# Patient Record
Sex: FEMALE | Race: WHITE | NOT HISPANIC OR LATINO | Employment: FULL TIME | URBAN - METROPOLITAN AREA
[De-identification: names, ages, dates, MRNs, and addresses within clinical notes are randomized per-mention and may not be internally consistent; named-entity substitution may affect disease eponyms.]

---

## 2017-01-19 ENCOUNTER — OFFICE VISIT (OUTPATIENT)
Dept: BEHAVIORAL HEALTH | Facility: PHYSICIAN GROUP | Age: 57
End: 2017-01-19
Payer: COMMERCIAL

## 2017-01-19 VITALS
SYSTOLIC BLOOD PRESSURE: 120 MMHG | TEMPERATURE: 98.1 F | RESPIRATION RATE: 16 BRPM | HEIGHT: 62 IN | DIASTOLIC BLOOD PRESSURE: 70 MMHG | HEART RATE: 92 BPM

## 2017-01-19 DIAGNOSIS — F33.0 MILD EPISODE OF RECURRENT MAJOR DEPRESSIVE DISORDER (HCC): ICD-10-CM

## 2017-01-19 PROCEDURE — 99213 OFFICE O/P EST LOW 20 MIN: CPT | Performed by: PSYCHIATRY & NEUROLOGY

## 2017-01-20 NOTE — PROGRESS NOTES
"[facility]  PSYCHIATRIC FOLLOW-UP NOTE    Name: Rita ALCALA [place]  MRN: [id number]  : 1960  Age: 56 y.o.  Date of assessment: 2017  PCP: DANNIELLE Ricardo  Persons in attendance: Patient    REASON FOR VISIT/CHIEF COMPLAINT (as stated by Patient):  Rita Kelly is a 56 y.o., White female, attending follow-up appointment for   Chief Complaint   Patient presents with   • Depression     The patient is seen today for follow up on her depression and she is doing well.   .      HISTORY OF PRESENT ILLNESS:    This is a 57 yo female, single , lives alone, seen today for follow up.    PSYCHOSOCIAL CHANGES SINCE PREVIOUS CONTACT:  Unchanged from previous visit.    RESPONSE TO TREATMENT:  Good.    MEDICATION SIDE EFFECTS:  Denied.    REVIEW OF SYSTEMS:        Constitutional positive - obese, gastric slevage   Eyes negative   Ears/Nose/Mouth/Throat negative   Cardiovascular positive - Mitral Valve prolapse   Respiratory positive - asthma.   Gastrointestinal negative   Genitourinary negative   Muscular negative   Integumentary negative   Neurological negative   Endocrine negative   Hematologic/Lymphatic negative       PSYCHIATRIC EXAMINATION/MENTAL STATUS  /70 mmHg  Pulse 92  Temp(Src) 36.7 °C (98.1 °F)  Resp 16  Ht 1.575 m (5' 2.01\")  Participation: Active verbal participation.  Grooming:Casual  Orientation: Alert and Fully Oriented  Eye contact: Good  Behavior:Calm   Mood: Anxious  Affect: Flexible and Full range  Thought process: Logical and Goal-directed  Thought content:  Within normal limits  Speech: Rate within normal limits and Volume within normal limits  Perception: with in normal.  Memory:  No gross evidence of memory deficits  Insight: Good  Judgment: Good  Family/couple interaction observations:   Other:    Current risk:    Suicide: Low   Homicide: Low   Self-harm: Low  Relapse: Low  Other:   Crisis Safety Plan reviewed?Yes  If evidence of imminent risk is present, " intervention/plan:    Medical Records/Labs/Diagnostic Tests Reviewed: yes.    Medical Records/Labs/Diagnostic Tests Ordered: none.    DIAGNOSTIC IMPRESSION(S):  1. Mild episode of recurrent major depressive disorder (CMS-[facility])           ASSESSMENT AND PLAN:  Major depression   stable on sertraline.    More than 50% of face-to-face time in this 30 minute visit was spent in psychotherapy/counseling.    Topics addressed include:    Willard Monroy M.D.

## 2017-03-16 ENCOUNTER — OFFICE VISIT (OUTPATIENT)
Dept: CARDIOLOGY | Facility: MEDICAL CENTER | Age: 57
End: 2017-03-16
Payer: COMMERCIAL

## 2017-03-16 VITALS
HEART RATE: 100 BPM | DIASTOLIC BLOOD PRESSURE: 90 MMHG | SYSTOLIC BLOOD PRESSURE: 140 MMHG | HEIGHT: 62 IN | WEIGHT: 277 LBS | OXYGEN SATURATION: 93 % | BODY MASS INDEX: 50.97 KG/M2

## 2017-03-16 DIAGNOSIS — R00.0 TACHYCARDIA: ICD-10-CM

## 2017-03-16 DIAGNOSIS — R06.09 DYSPNEA ON EXERTION: ICD-10-CM

## 2017-03-16 DIAGNOSIS — G47.33 OBSTRUCTIVE SLEEP APNEA: ICD-10-CM

## 2017-03-16 LAB — EKG IMPRESSION: NORMAL

## 2017-03-16 PROCEDURE — 93000 ELECTROCARDIOGRAM COMPLETE: CPT | Performed by: INTERNAL MEDICINE

## 2017-03-16 PROCEDURE — 99204 OFFICE O/P NEW MOD 45 MIN: CPT | Performed by: INTERNAL MEDICINE

## 2017-03-16 RX ORDER — PANTOPRAZOLE SODIUM 40 MG/1
40 TABLET, DELAYED RELEASE ORAL DAILY
COMMUNITY
Start: 2017-03-10

## 2017-03-16 ASSESSMENT — ENCOUNTER SYMPTOMS
EYE DISCHARGE: 0
HEARTBURN: 1
BRUISES/BLEEDS EASILY: 0
HEADACHES: 1
COUGH: 1
NERVOUS/ANXIOUS: 0
BACK PAIN: 1
NAUSEA: 0
PALPITATIONS: 0
WHEEZING: 1
CHILLS: 0
HEMOPTYSIS: 0
EYE PAIN: 0
MYALGIAS: 0
ABDOMINAL PAIN: 0
BLURRED VISION: 0
FEVER: 0
SPUTUM PRODUCTION: 1
DEPRESSION: 1
VOMITING: 0
LOSS OF CONSCIOUSNESS: 0
SPEECH CHANGE: 0

## 2017-03-16 NOTE — PROGRESS NOTES
Subjective:   Rita Kelly is a 56 y.o. female who presents today for new patient evaluation because of tachycardia.    A couple of occasions she's been noted to have some mild sinus tachycardia by her PCP. She was therefore referred for evaluation.    She has dyspnea on exertion at 100-200 feet. She's noted no PND or orthopnea. She does have a history of obstructive sleep apnea but is not using CPAP therapy. She's noted no edema or palpitations. A couple of times here she does have some lightheadedness which lasted minute or 2. This usually resolves when she sits down. She occasionally notes some sharp chest discomfort lasting seconds.    Past Medical History   Diagnosis Date   • Anxiety 6/9/2009   • Allergic rhinitis 6/9/2009   • Perimenopausal 6/9/2009   • Heart burn    • Coughing blood    • Breath shortness    • Snoring    • CATARACT    • Other specified symptom associated with female genital organs       SEVERE PMS   • Indigestion    • Psychiatric problem      DEPRESSION   • Anesthesia 2011     felt pain even though unconscious   • ASTHMA    • Hiatus hernia syndrome    • Sleep apnea      uses cpap     Past Surgical History   Procedure Laterality Date   • Cataract extraction with iol  2008     bilat   • Uvulopharyngopalatoplasty  3/17/2010     Performed by JALEN CHRISTY at SURGERY SAME DAY [facility] ORS   • Tonsillectomy  3/17/2010     Performed by JALEN CHRISTY at SURGERY SAME DAY [facility] ORS   • Antrostomy  3/17/2010     Performed by JALEN CHRISTY at SURGERY SAME DAY [facility] ORS   • Ethmoidectomy  3/17/2010     Performed by JALEN CHRISTY at SURGERY SAME DAY [facility] ORS   • Turbinoplasty  3/17/2010     Performed by JALEN CHRISTY at SURGERY SAME DAY [facility] ORS   • Gastroscopy  9/9/2010     Performed by MARQUIS QURESHI at SURGERY [facility] ORS   • Gastric sleeve laparoscopy  2010   • Splenectomy  2011   • Marixa by laparoscopy     • Hiatal hernia repair     • Tracheostomy      • Gastroscopy  2011     several with stent placement/removal   • Ventral hernia repair laparoscopic  4/12/2013     Performed by Farhad Ignacio M.D. at [facility]     Family History   Problem Relation Age of Onset   • Diabetes     • Heart Disease     • Hypertension     • Stroke     • Cancer     • Lung Disease     • Heart Disease Father 62     CABG and subsequent valve replacement surgery     History   Smoking status   • Former Smoker -- 0.50 packs/day for 20 years   • Types: Cigarettes   • Quit date: 01/01/1995   Smokeless tobacco   • Never Used     Comment: 1 ppd, 15 yrs     Allergies   Allergen Reactions   • Other Misc      wool   • Vancomycin Rash     Outpatient Encounter Prescriptions as of 3/16/2017   Medication Sig Dispense Refill   • pantoprazole (PROTONIX) 40 MG Tablet Delayed Response Take 40 mg by mouth every day.     • sertraline (ZOLOFT) 50 MG Tab TAKE ONE TABLET BY MOUTH AT BEDTIME 30 Tab 5   • Cholecalciferol (VITAMIN D3) 2000 UNITS CHEW Take  by mouth.     • Multiple Vitamin (MULTI-VITAMIN PO) Take  by mouth.     • CALCIUM PO Take  by mouth.     • NON SPECIFIED Hydro eye      • VITAMIN E by Does not apply route.     • Albuterol (VENTOLIN INH) Inhale  by mouth.       No facility-administered encounter medications on file as of 3/16/2017.     Review of Systems   Constitutional: Positive for malaise/fatigue. Negative for fever and chills.   HENT: Positive for nosebleeds. Negative for congestion.    Eyes: Negative for blurred vision, pain and discharge.   Respiratory: Positive for cough, sputum production and wheezing. Negative for hemoptysis.    Cardiovascular: Positive for chest pain. Negative for palpitations.   Gastrointestinal: Positive for heartburn. Negative for nausea, vomiting and abdominal pain.   Musculoskeletal: Positive for back pain and joint pain. Negative for myalgias.   Skin: Negative for itching and rash.   Neurological: Positive for headaches. Negative for speech change  "and loss of consciousness.   Endo/Heme/Allergies: Positive for environmental allergies. Does not bruise/bleed easily.   Psychiatric/Behavioral: Positive for depression. The patient is not nervous/anxious.    All other systems reviewed and are negative.       Objective:   /90 mmHg  Pulse 100  Ht 1.575 m (5' 2.01\")  Wt 125.646 kg (277 lb)  BMI 50.65 kg/m2  SpO2 93%    Physical Exam   Constitutional: She is oriented to person, place, and time. She appears well-developed. No distress.   HENT:   Mouth/Throat: Mucous membranes are normal.   Eyes: Conjunctivae and EOM are normal.   Neck: No JVD present. No tracheal deviation present. No thyroid mass and no thyromegaly present.   Cardiovascular: Normal rate, regular rhythm and intact distal pulses.    Murmur (treatment 3/6 systolic) heard.  Pulmonary/Chest: Effort normal and breath sounds normal. No respiratory distress. She exhibits no tenderness.   Abdominal: Soft. There is no tenderness.   Musculoskeletal: Normal range of motion. She exhibits no edema.   Neurological: She is alert and oriented to person, place, and time. She has normal strength. She displays no tremor.   Skin: Skin is warm and dry. She is not diaphoretic.   Psychiatric: She has a normal mood and affect. Her behavior is normal.   Vitals reviewed.    EKG: This shows a normal sinus rhythm with poor anterior R-wave progression and is otherwise unremarkable.    Postural vital signs: Patient had no CVA change in her pulse or heart rate from supine to sitting to standing.    Laboratory pulse ox patient's pulse oximetry did drop to 88% with ambulation. Her heart rate went up to 114.    Assessment:     1. Tachycardia     2. Obstructive sleep apnea     3. Dyspnea on exertion           Medical Decision Making:  Today's Assessment / Status / Plan:     Ms. Kelly is having difficulty with what is probably sinus tachycardia. I suspect this is due to poor physical conditioning. However, she does have some " mild hypoxemia with exertion. Given her history of obstructive sleep apnea, we will for her to pulmonary. She may benefit from CPAP. In addition, we discussed increasing her activity and getting an activity tracker. In addition, I would like her to walk for 30 minutes continuously at least 3 times a week and preferably more often. She will follow-up in a few months with an echocardiogram prior.

## 2017-03-16 NOTE — Clinical Note
[facility]   [address]  [place] [address]  Phone: [phone]  Fax: [phone]              Rita ALCALA [place]  1960    Encounter Date: 3/16/2017    Cesar Johnson M.D.          PROGRESS NOTE:  Subjective:   Rita Kelly is a 56 y.o. female who presents today for new patient evaluation because of tachycardia.    A couple of occasions she's been noted to have some mild sinus tachycardia by her PCP. She was therefore referred for evaluation.    She has dyspnea on exertion at 100-200 feet. She's noted no PND or orthopnea. She does have a history of obstructive sleep apnea but is not using CPAP therapy. She's noted no edema or palpitations. A couple of times here she does have some lightheadedness which lasted minute or 2. This usually resolves when she sits down. She occasionally notes some sharp chest discomfort lasting seconds.    Past Medical History   Diagnosis Date   • Anxiety 6/9/2009   • Allergic rhinitis 6/9/2009   • Perimenopausal 6/9/2009   • Heart burn    • Coughing blood    • Breath shortness    • Snoring    • CATARACT    • Other specified symptom associated with female genital organs       SEVERE PMS   • Indigestion    • Psychiatric problem      DEPRESSION   • Anesthesia 2011     felt pain even though unconscious   • ASTHMA    • Hiatus hernia syndrome    • Sleep apnea      uses cpap     Past Surgical History   Procedure Laterality Date   • Cataract extraction with iol  2008     bilat   • Uvulopharyngopalatoplasty  3/17/2010     Performed by JALEN CHRISTY at SURGERY SAME DAY [facility] ORS   • Tonsillectomy  3/17/2010     Performed by JALEN CHRISTY at SURGERY SAME DAY [facility] ORS   • Antrostomy  3/17/2010     Performed by JALEN CHRISTY at SURGERY SAME DAY [facility] ORS   • Ethmoidectomy  3/17/2010     Performed by JALEN CHRISTY at SURGERY SAME DAY [facility] ORS   • Turbinoplasty  3/17/2010     Performed by  JALEN CHRISTY at SURGERY SAME DAY [facility] ORS   • Gastroscopy  9/9/2010     Performed by MARQUIS QURESHI at SURGERY [facility] ORS   • Gastric sleeve laparoscopy  2010   • Splenectomy  2011   • Marixa by laparoscopy     • Hiatal hernia repair     • Tracheostomy     • Gastroscopy  2011     several with stent placement/removal   • Ventral hernia repair laparoscopic  4/12/2013     Performed by Farhad Ignacio M.D. at SURGERY [facility] ORS     Family History   Problem Relation Age of Onset   • Diabetes     • Heart Disease     • Hypertension     • Stroke     • Cancer     • Lung Disease     • Heart Disease Father 62     CABG and subsequent valve replacement surgery     History   Smoking status   • Former Smoker -- 0.50 packs/day for 20 years   • Types: Cigarettes   • Quit date: 01/01/1995   Smokeless tobacco   • Never Used     Comment: 1 ppd, 15 yrs     Allergies   Allergen Reactions   • Other Misc      wool   • Vancomycin Rash     Outpatient Encounter Prescriptions as of 3/16/2017   Medication Sig Dispense Refill   • pantoprazole (PROTONIX) 40 MG Tablet Delayed Response Take 40 mg by mouth every day.     • sertraline (ZOLOFT) 50 MG Tab TAKE ONE TABLET BY MOUTH AT BEDTIME 30 Tab 5   • Cholecalciferol (VITAMIN D3) 2000 UNITS CHEW Take  by mouth.     • Multiple Vitamin (MULTI-VITAMIN PO) Take  by mouth.     • CALCIUM PO Take  by mouth.     • NON SPECIFIED Hydro eye      • VITAMIN E by Does not apply route.     • Albuterol (VENTOLIN INH) Inhale  by mouth.       No facility-administered encounter medications on file as of 3/16/2017.     Review of Systems   Constitutional: Positive for malaise/fatigue. Negative for fever and chills.   HENT: Positive for nosebleeds. Negative for congestion.    Eyes: Negative for blurred vision, pain and discharge.   Respiratory: Positive for cough, sputum production and wheezing. Negative for hemoptysis.    Cardiovascular: Positive for chest pain. Negative for palpitations.    "  Gastrointestinal: Positive for heartburn. Negative for nausea, vomiting and abdominal pain.   Musculoskeletal: Positive for back pain and joint pain. Negative for myalgias.   Skin: Negative for itching and rash.   Neurological: Positive for headaches. Negative for speech change and loss of consciousness.   Endo/Heme/Allergies: Positive for environmental allergies. Does not bruise/bleed easily.   Psychiatric/Behavioral: Positive for depression. The patient is not nervous/anxious.    All other systems reviewed and are negative.       Objective:   /90 mmHg  Pulse 100  Ht 1.575 m (5' 2.01\")  Wt 125.646 kg (277 lb)  BMI 50.65 kg/m2  SpO2 93%    Physical Exam   Constitutional: She is oriented to person, place, and time. She appears well-developed. No distress.   HENT:   Mouth/Throat: Mucous membranes are normal.   Eyes: Conjunctivae and EOM are normal.   Neck: No JVD present. No tracheal deviation present. No thyroid mass and no thyromegaly present.   Cardiovascular: Normal rate, regular rhythm and intact distal pulses.    Murmur (treatment 3/6 systolic) heard.  Pulmonary/Chest: Effort normal and breath sounds normal. No respiratory distress. She exhibits no tenderness.   Abdominal: Soft. There is no tenderness.   Musculoskeletal: Normal range of motion. She exhibits no edema.   Neurological: She is alert and oriented to person, place, and time. She has normal strength. She displays no tremor.   Skin: Skin is warm and dry. She is not diaphoretic.   Psychiatric: She has a normal mood and affect. Her behavior is normal.   Vitals reviewed.    EKG: This shows a normal sinus rhythm with poor anterior R-wave progression and is otherwise unremarkable.    Postural vital signs: Patient had no CVA change in her pulse or heart rate from supine to sitting to standing.    Laboratory pulse ox patient's pulse oximetry did drop to 88% with ambulation. Her heart rate went up to 114.    Assessment:     1. Tachycardia     2. " Obstructive sleep apnea     3. Dyspnea on exertion           Medical Decision Making:  Today's Assessment / Status / Plan:     Ms. Kelly is having difficulty with what is probably sinus tachycardia. I suspect this is due to poor physical conditioning. However, she does have some mild hypoxemia with exertion. Given her history of obstructive sleep apnea, we will for her to pulmonary. She may benefit from CPAP. In addition, we discussed increasing her activity and getting an activity tracker. In addition, I would like her to walk for 30 minutes continuously at least 3 times a week and preferably more often. She will follow-up in a few months with an echocardiogram prior.      Myranda Alvarado, F.N.P.  [address]  [facility] [address]  VIA Facsimile: [phone]

## 2017-03-16 NOTE — MR AVS SNAPSHOT
"        Rita A [place]   3/16/2017 1:00 PM   Office Visit   MRN: [id number]    Department:  [facility]   Dept Phone:  [phone]    Description:  Female : 1960   Provider:  Cesar Johnson M.D.           Reason for Visit     New Patient           Allergies as of 3/16/2017     Allergen Noted Reactions    Other Misc 2013       wool    Vancomycin 2013   Rash      You were diagnosed with     Tachycardia   [027007]       Obstructive sleep apnea   [169892]       Dyspnea on exertion   [297146]         Vital Signs     Blood Pressure Pulse Height Weight Body Mass Index Oxygen Saturation    140/90 mmHg 100 1.575 m (5' 2.01\") 125.646 kg (277 lb) 50.65 kg/m2 93%    Smoking Status                   Former Smoker           Basic Information     Date Of Birth Sex Race Ethnicity Preferred Language    1960 Female White Non- English      Your appointments     2017  1:15 PM   ECHO with ECHO [facility], V EXAM 9   ECHOCARDIOLOGY [facility] ([facility])    [address] [place] [address]   [phone]           No prep            2017  4:30 PM   Follow Up Med Management with Willard Monroy M.D.   BE[facility]IORAL [facility] [address] [facility])    [address]  [address]  [place] [address]   [phone]            2017  3:00 PM   FOLLOW UP with Cesar Johnson M.D.   [facility] (--)    [address], [address]  [place] [id number] [phone]              Problem List              ICD-10-CM Priority Class Noted - Resolved    Anxiety F41.9   2009 - Present    Allergic rhinitis    2009 - Present    Perimenopausal N95.1   2009 - Present    Incisional hernia K43.2   2013 - Present    Hernia K46.9   2013 - Present    Leukocytosis D72.829   2013 - Present    Tachycardia R00.0   3/16/2017 - Present    Obstructive sleep apnea G47.33   3/16/2017 - Present    Dyspnea on exertion R06.09   3/16/2017 - Present      Health " Maintenance        Date Due Completion Dates    IMM DTaP/Tdap/Td Vaccine (1 - Tdap) 12/3/1981 ---    PAP SMEAR 12/3/1983 ---    COLONOSCOPY 12/3/2012 ---    IMM INFLUENZA (1) 9/1/2016 ---    MAMMOGRAM 5/5/2017 5/5/2016, 2/24/2014, 2/14/2013, 2/12/2009, 2/12/2009, 5/10/2007, 5/10/2007, 3/20/2006            Results       Current Immunizations     No immunizations on file.      Below and/or attached are the medications your provider expects you to take. Review all of your home medications and newly ordered medications with your provider and/or pharmacist. Follow medication instructions as directed by your provider and/or pharmacist. Please keep your medication list with you and share with your provider. Update the information when medications are discontinued, doses are changed, or new medications (including over-the-counter products) are added; and carry medication information at all times in the event of emergency situations     Allergies:  OTHER MISC - (reactions not documented)     VANCOMYCIN - Rash               Medications  Valid as of: March 16, 2017 -  2:57 PM    Generic Name Brand Name Tablet Size Instructions for use    Albuterol   Inhale  by mouth.        Calcium   Take  by mouth.        Cholecalciferol (Chew Tab) Vitamin D3 2000 UNITS Take  by mouth.        Multiple Vitamin   Take  by mouth.        NON SPECIFIED   Hydro eye         Pantoprazole Sodium (Tablet Delayed Response) PROTONIX 40 MG Take 40 mg by mouth every day.        Sertraline HCl (Tab) ZOLOFT 50 MG TAKE ONE TABLET BY MOUTH AT BEDTIME        Vitamin E   by Does not apply route.        .                 Medicines prescribed today were sent to:     [facility] [address] [place] [address] [address] [address]    Phone: [phone] Fax: [phone]    Open 24 Hours?: No      Medication refill instructions:       If your prescription bottle indicates you have medication refills left, it is not necessary to  call your provider’s office. Please contact your pharmacy and they will refill your medication.    If your prescription bottle indicates you do not have any refills left, you may request refills at any time through one of the following ways: The online [employer] system (except Urgent Care), by calling your provider’s office, or by asking your pharmacy to contact your provider’s office with a refill request. Medication refills are processed only during regular business hours and may not be available until the next business day. Your provider may request additional information or to have a follow-up visit with you prior to refilling your medication.   *Please Note: Medication refills are assigned a new Rx number when refilled electronically. Your pharmacy may indicate that no refills were authorized even though a new prescription for the same medication is available at the pharmacy. Please request the medicine by name with the pharmacy before contacting your provider for a refill.        Your To Do List     Future Labs/Procedures Complete By Expires    Echocardiogram Comp w/o Cont  As directed 3/16/2018    Scheduling Instructions:    Within the next few months      Referral     A referral request has been sent to our patient care coordination department. Please allow 3-5 business days for us to process this request and contact you either by phone or mail. If you do not hear from us by the 5th business day, please call us at [phone].           [employer] Access Code: [email or web address]  Expires: 4/15/2017  2:36 PM    [employer]  A secure, online tool to manage your health information     [employer]’s [employer]® is a secure, online tool that connects you to your personalized health information from the privacy of your home -- day or night - making it very easy for you to manage your healthcare. Once the activation process is completed, you can even access your medical information using the [employer] maría, which is available  for free in the [employer] store or Google Play store.     [employer] provides the following levels of access (as shown below):   My Chart Features   [facility] Doctor [facility]  Specialists [facility]  Urgent  Care Non-[facility]  Primary Care  Doctor   Email your healthcare team securely and privately 24/7 X X X    Manage appointments: schedule your next appointment; view details of past/upcoming appointments X      Request prescription refills. X      View recent personal medical records, including lab and immunizations X X X X   View health record, including health history, allergies, medications X X X X   Read reports about your outpatient visits, procedures, consult and ER notes X X X X   See your discharge summary, which is a recap of your hospital and/or ER visit that includes your diagnosis, lab results, and care plan. X X       How to register for [employer]:  1. Go to  [email or web address].  2. Click on the Sign Up Now box, which takes you to the New Member Sign Up page. You will need to provide the following information:  a. Enter your [employer] Access Code exactly as it appears at the top of this page. (You will not need to use this code after you’ve completed the sign-up process. If you do not sign up before the expiration date, you must request a new code.)   b. Enter your date of birth.   c. Enter your home email address.   d. Click Submit, and follow the next screen’s instructions.  3. Create a [employer] ID. This will be your [employer] login ID and cannot be changed, so think of one that is secure and easy to remember.  4. Create a [employer] password. You can change your password at any time.  5. Enter your Password Reset Question and Answer. This can be used at a later time if you forget your password.   6. Enter your e-mail address. This allows you to receive e-mail notifications when new information is available in [employer].  7. Click Sign Up. You can now view your health information.    For assistance  activating your [employer] account, call [phone]

## 2017-04-13 ENCOUNTER — HOSPITAL ENCOUNTER (OUTPATIENT)
Dept: CARDIOLOGY | Facility: MEDICAL CENTER | Age: 57
End: 2017-04-13
Attending: INTERNAL MEDICINE
Payer: COMMERCIAL

## 2017-04-13 DIAGNOSIS — G47.33 OBSTRUCTIVE SLEEP APNEA: ICD-10-CM

## 2017-04-13 DIAGNOSIS — R00.0 TACHYCARDIA: ICD-10-CM

## 2017-04-13 DIAGNOSIS — R06.09 DYSPNEA ON EXERTION: ICD-10-CM

## 2017-04-13 PROCEDURE — 93306 TTE W/DOPPLER COMPLETE: CPT | Mod: 26 | Performed by: INTERNAL MEDICINE

## 2017-04-13 PROCEDURE — 93306 TTE W/DOPPLER COMPLETE: CPT

## 2017-04-14 LAB
LV EJECT FRACT  99904: 65
LV EJECT FRACT MOD 2C 99903: 79.09
LV EJECT FRACT MOD 4C 99902: 69.27
LV EJECT FRACT MOD BP 99901: 72

## 2017-04-20 ENCOUNTER — OFFICE VISIT (OUTPATIENT)
Dept: BEHAVIORAL HEALTH | Facility: PHYSICIAN GROUP | Age: 57
End: 2017-04-20
Payer: COMMERCIAL

## 2017-04-20 VITALS
HEART RATE: 90 BPM | SYSTOLIC BLOOD PRESSURE: 132 MMHG | BODY MASS INDEX: 50.97 KG/M2 | RESPIRATION RATE: 16 BRPM | WEIGHT: 277 LBS | DIASTOLIC BLOOD PRESSURE: 78 MMHG | TEMPERATURE: 98.1 F | HEIGHT: 62 IN

## 2017-04-20 DIAGNOSIS — F33.0 MILD EPISODE OF RECURRENT MAJOR DEPRESSIVE DISORDER (HCC): ICD-10-CM

## 2017-04-20 PROCEDURE — 99213 OFFICE O/P EST LOW 20 MIN: CPT | Performed by: PSYCHIATRY & NEUROLOGY

## 2017-04-21 NOTE — PROGRESS NOTES
"[facility]  PSYCHIATRIC FOLLOW-UP NOTE    Name: Rita [place]A [place]  MRN: [id number]  : 1960  Age: 56 y.o.  Date of assessment: 2017  PCP: DANNIELLE Ricardo  Persons in attendance: Patient    REASON FOR VISIT/CHIEF COMPLAINT (as stated by Patient):  Rita ALCALA [place] is a 56 y.o., White female, attending follow-up appointment for   Chief Complaint   Patient presents with   • Depression     The patient is seen today for follow up on her depression.   .      HISTORY OF PRESENT ILLNESS:    This is a 55 yo female, employed, single, seen today for follow up on her depression. The patient reported increase stress at work and they are moving to a different location. The patient has been sleeping better.    PSYCHOSOCIAL CHANGES SINCE PREVIOUS CONTACT:  Denied depression but complained of anxiety and worries.    RESPONSE TO TREATMENT:  Good.    MEDICATION SIDE EFFECTS:  Denied.    REVIEW OF SYSTEMS:        Constitutional positive - obesity   Eyes positive - cataract   Ears/Nose/Mouth/Throat negative   Cardiovascular negative   Respiratory positive - obstructive sleep apnea, asthma   Gastrointestinal negative   Genitourinary negative   Muscular negative   Integumentary negative   Neurological negative   Endocrine negative   Hematologic/Lymphatic negative       PSYCHIATRIC EXAMINATION/MENTAL STATUS  /78 mmHg  Pulse 90  Temp(Src) 36.7 °C (98.1 °F)  Resp 16  Ht 1.575 m (5' 2.01\")  Wt 125.646 kg (277 lb)  BMI 50.65 kg/m2  Participation: Active verbal participation and Attentive  Grooming:Casual  Orientation: Alert and Fully Oriented  Eye contact: Good  Behavior:Calm   Mood: Anxious  Affect: Flexible and Full range  Thought process: Logical and Goal-directed  Thought content:  Within normal limits  Speech: Rate within normal limits and Volume within normal limits  Perception:  Within normal limits  Memory:  No gross evidence of memory deficits  Insight: Good  Judgment: Good  Family/couple " interaction observations:   Other:    Current risk:    Suicide: Low   Homicide: Low   Self-harm: Low  Relapse: Low  Other:   Crisis Safety Plan reviewed?Yes  If evidence of imminent risk is present, intervention/plan:    Medical Records/Labs/Diagnostic Tests Reviewed: yes.    Medical Records/Labs/Diagnostic Tests Ordered: none.    DIAGNOSTIC IMPRESSION(S):  1. Mild episode of recurrent major depressive disorder (CMS-HCC)           ASSESSMENT AND PLAN:  Major depression  Sertraline 50 mg per day  Follow up in three months.    More than 50% of face-to-face time in this 30 minute visit was spent in psychotherapy/counseling.    Topics addressed include:  Diet and exercise.  Cognitive restructuring na d changes in behavior.  Good impulse control  Relaxation and breathing exercise.    Willard Monroy M.D.

## 2017-06-27 ENCOUNTER — OFFICE VISIT (OUTPATIENT)
Dept: CARDIOLOGY | Facility: MEDICAL CENTER | Age: 57
End: 2017-06-27
Payer: COMMERCIAL

## 2017-06-27 ENCOUNTER — TELEPHONE (OUTPATIENT)
Dept: CARDIOLOGY | Facility: MEDICAL CENTER | Age: 57
End: 2017-06-27

## 2017-06-27 VITALS
BODY MASS INDEX: 50.97 KG/M2 | HEIGHT: 62 IN | SYSTOLIC BLOOD PRESSURE: 140 MMHG | DIASTOLIC BLOOD PRESSURE: 80 MMHG | OXYGEN SATURATION: 90 % | HEART RATE: 86 BPM | WEIGHT: 277 LBS

## 2017-06-27 DIAGNOSIS — G47.33 OBSTRUCTIVE SLEEP APNEA: ICD-10-CM

## 2017-06-27 DIAGNOSIS — R06.09 DYSPNEA ON EXERTION: ICD-10-CM

## 2017-06-27 DIAGNOSIS — I35.0 AORTIC STENOSIS, MILD: ICD-10-CM

## 2017-06-27 DIAGNOSIS — R00.0 TACHYCARDIA: ICD-10-CM

## 2017-06-27 PROCEDURE — 99214 OFFICE O/P EST MOD 30 MIN: CPT | Performed by: INTERNAL MEDICINE

## 2017-06-27 NOTE — Clinical Note
[facility]   [address]  [place] [address]  Phone: [phone]  Fax: [phone]              Rita ALCALA [place]  1960    Encounter Date: 6/27/2017    Cesar Johnson M.D.          PROGRESS NOTE:  Subjective:   Rita [place] is a 56 y.o. female who presents today for follow-up of her tachycardia. She also has dyspnea on exertion and a history of obstructive sleep apnea.    She was having difficulty with sinus tachycardia. We discussed increasing her activity level and getting an activity tractor. She is getting them between 8000 and 10,000 steps daily. She is going for a 30 minute walk about twice weekly. She does have an appointment to see pulmonary in early July.    She feels she can walk a couple 100 yards before she is dyspneic. This is some improvement from her prior activity level. She is using her CPAP at night. She is still somewhat somnolent intermittently.    She's noted no chest discomfort, edema, palpitations or lightheadedness.    Past Medical History   Diagnosis Date   • Anxiety 6/9/2009   • Allergic rhinitis 6/9/2009   • Perimenopausal 6/9/2009   • Heart burn    • Coughing blood    • Breath shortness    • Snoring    • CATARACT    • Other specified symptom associated with female genital organs       SEVERE PMS   • Indigestion    • Psychiatric problem      DEPRESSION   • Anesthesia 2011     felt pain even though unconscious   • ASTHMA    • Hiatus hernia syndrome    • Sleep apnea      uses cpap   • Asthma      Past Surgical History   Procedure Laterality Date   • Cataract extraction with iol  2008     bilat   • Uvulopharyngopalatoplasty  3/17/2010     Performed by JALEN CHRISTY at SURGERY SAME DAY [facility] ORS   • Tonsillectomy  3/17/2010     Performed by JALEN CHRISTY at SURGERY SAME DAY [facility] ORS   • Antrostomy  3/17/2010     Performed by JALEN CHRISTY at SURGERY SAME DAY [facility] ORS   • Ethmoidectomy   "3/17/2010     Performed by JALEN CHRISTY at SURGERY SAME DAY [facility] ORS   • Turbinoplasty  3/17/2010     Performed by JALEN CHRISTY at SURGERY SAME DAY [facility] ORS   • Gastroscopy  9/9/2010     Performed by MARQUIS QURESHI at SURGERY [facility] ORS   • Gastric sleeve laparoscopy  2010   • Splenectomy  2011   • Marixa by laparoscopy     • Hiatal hernia repair     • Tracheostomy     • Gastroscopy  2011     several with stent placement/removal   • Ventral hernia repair laparoscopic  4/12/2013     Performed by Farhad Ignacio M.D. at SURGERY [facility]     Family History   Problem Relation Age of Onset   • Diabetes     • Heart Disease     • Hypertension     • Stroke     • Cancer     • Lung Disease     • Heart Disease Father 62     CABG and subsequent valve replacement surgery     History   Smoking status   • Former Smoker -- 0.50 packs/day for 20 years   • Types: Cigarettes   • Quit date: 01/01/1995   Smokeless tobacco   • Never Used     Comment: 1 ppd, 15 yrs     Allergies   Allergen Reactions   • Other Misc      wool   • Vancomycin Rash     Outpatient Encounter Prescriptions as of 6/27/2017   Medication Sig Dispense Refill   • Fluticasone Furoate-Vilanterol (BREO ELLIPTA INH) Inhale  by mouth.     • BIOTIN PO Take  by mouth.     • pantoprazole (PROTONIX) 40 MG Tablet Delayed Response Take 40 mg by mouth every day.     • sertraline (ZOLOFT) 50 MG Tab TAKE ONE TABLET BY MOUTH AT BEDTIME 30 Tab 5   • Cholecalciferol (VITAMIN D3) 2000 UNITS CHEW Take  by mouth.     • Multiple Vitamin (MULTI-VITAMIN PO) Take  by mouth.     • CALCIUM PO Take  by mouth.     • NON SPECIFIED Hydro eye      • VITAMIN E by Does not apply route.     • Albuterol (VENTOLIN INH) Inhale  by mouth.       No facility-administered encounter medications on file as of 6/27/2017.     ROS     Objective:   /80 mmHg  Pulse 86  Ht 1.575 m (5' 2\")  Wt 125.646 kg (277 lb)  BMI 50.65 kg/m2  SpO2 90%    Physical Exam   Neck: No JVD " present.   Cardiovascular: Normal rate and regular rhythm.  Exam reveals no gallop.    Murmur (2 to 3/6 systolic at the base) heard.  Pulmonary/Chest: Effort normal. She has no rales.   Abdominal: Soft. There is no tenderness.   Musculoskeletal: She exhibits no edema.     Echocardiogram:  CONCLUSIONS  No prior study is available for comparison.   Normal left ventricular chamber size. Normal left ventricular wall   thickness. Normal left ventricular systolic function. Left ventricular   ejection fraction is visually estimated to be 65%. A reliable   estimation of diastolic function cannot be made due to conflicting   data.   Mild aortic stenosis. Transvalvular gradients are - Peak:24  mmHg,   Mean: 12 mmHg.  Vmax is 2.44  m/s. Dimensionless index is. 47.  Mild   aortic insufficiency. Valve is not optimally visualized but is probably   tri leaflet. Increased gradient could be do to supra or sub valvular   membrane. Cannot exclude some increased gradient from a hyperdynamic   state.  Exam Date:         04/13/2017       Assessment:     1. Tachycardia     2. Obstructive sleep apnea     3. Dyspnea on exertion     4. Aortic stenosis, mild        Medical Decision Making:  Today's Assessment / Status / Plan:     Ms. Kelly is clinically stable. She's had significant improvement in her tachycardia and is walking more regularly. I feel she could lose some weight this would improve both her heart rate and her obstructive sleep apnea. She is going to see pulmonary about the adequacy of her CPAP. Her dyspnea on exertion has also improved somewhat. I would like to try Mediterranean-style diet. Her echocardiogram is consistent with mild aortic stenosis of the valve was not optimally visualized. Her LV function was normal. She'll continue her activity level and follow-up in about 6 months.      Myranda Alvarado, F.N.P.  [address]  [place] [address]  VIA Facsimile: [phone]

## 2017-06-27 NOTE — TELEPHONE ENCOUNTER
Spoke with Dr. Johnson, he read the patient's echocardiogram which shows mild aortic stenosis. No changes in therapy needed at this time.    Called patient and advised her of the above. Patient verbalized understanding and is thankful for the call.    JASMIN ROGEL

## 2017-06-27 NOTE — PROGRESS NOTES
Subjective:   Rita Kelly is a 56 y.o. female who presents today for follow-up of her tachycardia. She also has dyspnea on exertion and a history of obstructive sleep apnea.    She was having difficulty with sinus tachycardia. We discussed increasing her activity level and getting an activity tractor. She is getting them between 8000 and 10,000 steps daily. She is going for a 30 minute walk about twice weekly. She does have an appointment to see pulmonary in early July.    She feels she can walk a couple 100 yards before she is dyspneic. This is some improvement from her prior activity level. She is using her CPAP at night. She is still somewhat somnolent intermittently.    She's noted no chest discomfort, edema, palpitations or lightheadedness.    Past Medical History   Diagnosis Date   • Anxiety 6/9/2009   • Allergic rhinitis 6/9/2009   • Perimenopausal 6/9/2009   • Heart burn    • Coughing blood    • Breath shortness    • Snoring    • CATARACT    • Other specified symptom associated with female genital organs       SEVERE PMS   • Indigestion    • Psychiatric problem      DEPRESSION   • Anesthesia 2011     felt pain even though unconscious   • ASTHMA    • Hiatus hernia syndrome    • Sleep apnea      uses cpap   • Asthma      Past Surgical History   Procedure Laterality Date   • Cataract extraction with iol  2008     bilat   • Uvulopharyngopalatoplasty  3/17/2010     Performed by JALEN CHRISTY at SURGERY SAME DAY [facility] ORS   • Tonsillectomy  3/17/2010     Performed by JALEN CHRISTY at SURGERY SAME DAY [facility]S   • Antrostomy  3/17/2010     Performed by JALEN CHRISTY at SURGERY SAME DAY [facility]S   • Ethmoidectomy  3/17/2010     Performed by JALEN CHRISTY at SURGERY SAME DAY [facility]S   • Turbinoplasty  3/17/2010     Performed by JALEN CHRISTY at SURGERY SAME DAY [facility]   • Gastroscopy  9/9/2010     Performed by MARQUIS QURESHI at [facility]   •  "Gastric sleeve laparoscopy  2010   • Splenectomy  2011   • Marixa by laparoscopy     • Hiatal hernia repair     • Tracheostomy     • Gastroscopy  2011     several with stent placement/removal   • Ventral hernia repair laparoscopic  4/12/2013     Performed by Farhad Ignacio M.D. at [facility]     Family History   Problem Relation Age of Onset   • Diabetes     • Heart Disease     • Hypertension     • Stroke     • Cancer     • Lung Disease     • Heart Disease Father 62     CABG and subsequent valve replacement surgery     History   Smoking status   • Former Smoker -- 0.50 packs/day for 20 years   • Types: Cigarettes   • Quit date: 01/01/1995   Smokeless tobacco   • Never Used     Comment: 1 ppd, 15 yrs     Allergies   Allergen Reactions   • Other Misc      wool   • Vancomycin Rash     Outpatient Encounter Prescriptions as of 6/27/2017   Medication Sig Dispense Refill   • Fluticasone Furoate-Vilanterol (BREO ELLIPTA INH) Inhale  by mouth.     • BIOTIN PO Take  by mouth.     • pantoprazole (PROTONIX) 40 MG Tablet Delayed Response Take 40 mg by mouth every day.     • sertraline (ZOLOFT) 50 MG Tab TAKE ONE TABLET BY MOUTH AT BEDTIME 30 Tab 5   • Cholecalciferol (VITAMIN D3) 2000 UNITS CHEW Take  by mouth.     • Multiple Vitamin (MULTI-VITAMIN PO) Take  by mouth.     • CALCIUM PO Take  by mouth.     • NON SPECIFIED Hydro eye      • VITAMIN E by Does not apply route.     • Albuterol (VENTOLIN INH) Inhale  by mouth.       No facility-administered encounter medications on file as of 6/27/2017.     ROS     Objective:   /80 mmHg  Pulse 86  Ht 1.575 m (5' 2\")  Wt 125.646 kg (277 lb)  BMI 50.65 kg/m2  SpO2 90%    Physical Exam   Neck: No JVD present.   Cardiovascular: Normal rate and regular rhythm.  Exam reveals no gallop.    Murmur (2 to 3/6 systolic at the base) heard.  Pulmonary/Chest: Effort normal. She has no rales.   Abdominal: Soft. There is no tenderness.   Musculoskeletal: She exhibits no edema. "     Echocardiogram:  CONCLUSIONS  No prior study is available for comparison.   Normal left ventricular chamber size. Normal left ventricular wall   thickness. Normal left ventricular systolic function. Left ventricular   ejection fraction is visually estimated to be 65%. A reliable   estimation of diastolic function cannot be made due to conflicting   data.   Mild aortic stenosis. Transvalvular gradients are - Peak:24  mmHg,   Mean: 12 mmHg.  Vmax is 2.44  m/s. Dimensionless index is. 47.  Mild   aortic insufficiency. Valve is not optimally visualized but is probably   tri leaflet. Increased gradient could be do to supra or sub valvular   membrane. Cannot exclude some increased gradient from a hyperdynamic   state.  Exam Date:         04/13/2017       Assessment:     1. Tachycardia     2. Obstructive sleep apnea     3. Dyspnea on exertion     4. Aortic stenosis, mild        Medical Decision Making:  Today's Assessment / Status / Plan:     Ms. Kelly is clinically stable. She's had significant improvement in her tachycardia and is walking more regularly. I feel she could lose some weight this would improve both her heart rate and her obstructive sleep apnea. She is going to see pulmonary about the adequacy of her CPAP. Her dyspnea on exertion has also improved somewhat. I would like to try Mediterranean-style diet. Her echocardiogram is consistent with mild aortic stenosis of the valve was not optimally visualized. Her LV function was normal. She'll continue her activity level and follow-up in about 6 months.

## 2017-06-27 NOTE — MR AVS SNAPSHOT
"        Rita A [place]   2017 3:00 PM   Office Visit   MRN: [id number]    Department:  [facility]   Dept Phone:  [phone]    Description:  Female : 1960   Provider:  Cesar Johnson M.D.           Reason for Visit     Follow-Up           Allergies as of 2017     Allergen Noted Reactions    Other Misc 2013       wool    Vancomycin 2013   Rash      You were diagnosed with     Tachycardia   [334483]       Obstructive sleep apnea   [028860]       Dyspnea on exertion   [905134]         Vital Signs     Blood Pressure Pulse Height Weight Body Mass Index Oxygen Saturation    140/80 mmHg 86 1.575 m (5' 2\") 125.646 kg (277 lb) 50.65 kg/m2 90%    Smoking Status                   Former Smoker           Basic Information     Date Of Birth Sex Race Ethnicity Preferred Language    1960 Female White Non- English      Your appointments     2017 10:00 AM   New Patient Pulmonary with A Rotation   [facility] (--)    [address]  [address]  [place] [address] [phone]            2017  4:00 PM   Follow Up Med Management with Willard Monroy M.D.   [facility] [address] [facility]    [address]  [address]  [place] [address]   [phone]            2018  4:15 PM   FOLLOW UP with Cesar Johnson M.D.   [facility] (--)    [address]  [place] [id number] [phone]              Problem List              ICD-10-CM Priority Class Noted - Resolved    Anxiety F41.9   2009 - Present    Allergic rhinitis    2009 - Present    Perimenopausal N95.1   2009 - Present    Incisional hernia K43.2   2013 - Present    Hernia K46.9   2013 - Present    Leukocytosis D72.829   2013 - Present    Tachycardia R00.0   3/16/2017 - Present    Obstructive sleep apnea G47.33   3/16/2017 - Present    Dyspnea on exertion R06.09   3/16/2017 - Present      Health " Maintenance        Date Due Completion Dates    IMM DTaP/Tdap/Td Vaccine (1 - Tdap) 12/3/1981 ---    PAP SMEAR 12/3/1983 ---    COLONOSCOPY 12/3/2012 ---    MAMMOGRAM 5/5/2017 5/5/2016, 2/24/2014, 2/14/2013, 2/12/2009, 2/12/2009, 5/10/2007, 5/10/2007, 3/20/2006            Current Immunizations     No immunizations on file.      Below and/or attached are the medications your provider expects you to take. Review all of your home medications and newly ordered medications with your provider and/or pharmacist. Follow medication instructions as directed by your provider and/or pharmacist. Please keep your medication list with you and share with your provider. Update the information when medications are discontinued, doses are changed, or new medications (including over-the-counter products) are added; and carry medication information at all times in the event of emergency situations     Allergies:  OTHER MISC - (reactions not documented)     VANCOMYCIN - Rash               Medications  Valid as of: June 27, 2017 -  3:44 PM    Generic Name Brand Name Tablet Size Instructions for use    Albuterol   Inhale  by mouth.        Biotin   Take  by mouth.        Calcium   Take  by mouth.        Cholecalciferol (Chew Tab) Vitamin D3 2000 UNITS Take  by mouth.        Fluticasone Furoate-Vilanterol   Inhale  by mouth.        Multiple Vitamin   Take  by mouth.        NON SPECIFIED   Hydro eye         Pantoprazole Sodium (Tablet Delayed Response) PROTONIX 40 MG Take 40 mg by mouth every day.        Sertraline HCl (Tab) ZOLOFT 50 MG TAKE ONE TABLET BY MOUTH AT BEDTIME        Vitamin E   by Does not apply route.        .                 Medicines prescribed today were sent to:     [facility] [address] [place] [address] - [address] [address] [facility] [address]    Phone: [phone] Fax: [phone]    Open 24 Hours?: No      Medication refill instructions:       If your prescription bottle indicates you have  medication refills left, it is not necessary to call your provider’s office. Please contact your pharmacy and they will refill your medication.    If your prescription bottle indicates you do not have any refills left, you may request refills at any time through one of the following ways: The online [employer] system (except Urgent Care), by calling your provider’s office, or by asking your pharmacy to contact your provider’s office with a refill request. Medication refills are processed only during regular business hours and may not be available until the next business day. Your provider may request additional information or to have a follow-up visit with you prior to refilling your medication.   *Please Note: Medication refills are assigned a new Rx number when refilled electronically. Your pharmacy may indicate that no refills were authorized even though a new prescription for the same medication is available at the pharmacy. Please request the medicine by name with the pharmacy before contacting your provider for a refill.           [employer] Access Code: Activation code not generated  Current [employer] Status: Active

## 2017-07-06 ENCOUNTER — OFFICE VISIT (OUTPATIENT)
Dept: PULMONOLOGY | Facility: HOSPICE | Age: 57
End: 2017-07-06
Payer: COMMERCIAL

## 2017-07-06 VITALS
BODY MASS INDEX: 48.76 KG/M2 | HEIGHT: 62 IN | TEMPERATURE: 97.9 F | DIASTOLIC BLOOD PRESSURE: 82 MMHG | SYSTOLIC BLOOD PRESSURE: 128 MMHG | RESPIRATION RATE: 16 BRPM | WEIGHT: 265 LBS | OXYGEN SATURATION: 95 % | HEART RATE: 74 BPM

## 2017-07-06 DIAGNOSIS — I35.0 AORTIC STENOSIS, MILD: ICD-10-CM

## 2017-07-06 DIAGNOSIS — G47.33 OBSTRUCTIVE SLEEP APNEA: ICD-10-CM

## 2017-07-06 DIAGNOSIS — Z98.890 H/O TRACHEOSTOMY: ICD-10-CM

## 2017-07-06 DIAGNOSIS — R00.0 TACHYCARDIA: ICD-10-CM

## 2017-07-06 DIAGNOSIS — Z90.81 HISTORY OF SPLENECTOMY: ICD-10-CM

## 2017-07-06 DIAGNOSIS — R06.09 DYSPNEA ON EXERTION: ICD-10-CM

## 2017-07-06 DIAGNOSIS — J45.30 MILD PERSISTENT ASTHMA WITHOUT COMPLICATION: ICD-10-CM

## 2017-07-06 PROCEDURE — 99244 OFF/OP CNSLTJ NEW/EST MOD 40: CPT | Performed by: INTERNAL MEDICINE

## 2017-07-06 RX ORDER — MONTELUKAST SODIUM 10 MG/1
10 TABLET ORAL EVERY EVENING
Qty: 30 TAB | Refills: 11 | Status: SHIPPED | OUTPATIENT
Start: 2017-07-06

## 2017-07-06 ASSESSMENT — PATIENT HEALTH QUESTIONNAIRE - PHQ9: CLINICAL INTERPRETATION OF PHQ2 SCORE: 1

## 2017-07-06 NOTE — Clinical Note
Blake Armenta M.D.  [facility] Pulmonary Medicine   [address] [place] [address] [place]  [place] [address]  Phone: [phone] - Fax: [phone]           Encounter Date: 7/6/2017  Provider: Blake Armenta M.D.  Location of Care: [facility] PULMONARY MEDICINE      Patient:   Rita Kelly   MR Number: [id number]   YOB: 1960     PROGRESS NOTE:  Chief Complaint   Patient presents with   • New Patient     Shortness of breath       HPI:  The patient is a 56-year-old woman who is a previous patient of [facility]. She has a remote smoking history. She has a history of allergies, sinusitis, rhinitis, and asthma. She also has a history of obstructive sleep apnea. In 2011 she had bariatric surgery. Apparently she had postoperative complications which eventually led to reoperation, a splenectomy, and a prolonged course on mechanical ventilation requiring a tracheostomy and care at [facility] after a long hospitalization at [facility].    This spring she has had some increased symptoms of shortness of breath and upper respiratory congestion. She uses fluticasone/vilanterol on a daily basis. She has noticed that she uses her albuterol rescue inhaler a bit more than usual. She has had prior sinus surgery by Dr. Smallwood. Previous PFTs have shown an FEV1 of 80% of predicted improving to 85% of predicted after an inhaled bronchodilator. She is not currently wheezing. At work she usually tries to walk up 2 flights of stairs on a regular basis. Because of her increased symptoms she has not been anemic to be quite as active. She has noted that she's coughing a bit more and bringing up a little bit more sputum. She has no history of fever or chills. She has no history of inspiratory stridor. Her flow volume loop in 2012 showed no abnormalities. She has had an allergy evaluation by Dr. Dewitt.    Her prior sleep study was reviewed. She was treated  successfully with CPAP at 7 cm of water pressure. She uses the CPAP on a regular basis and feels that it helps her feel more rested. However, she has not gotten replacement mask and supplies in quite some time. She had a prior UPPP performed by Dr. Smallwood. She has also seen Dr. Pham in the past.    She has been seen by cardiology for a history of tachycardia. She has been increasing her exercise. Her echocardiogram shows some mild AMS. Diastolic dysfunction was not able to be assessed.    Past Medical History   Diagnosis Date   • Anxiety 6/9/2009   • Allergic rhinitis 6/9/2009   • Perimenopausal 6/9/2009   • Heart burn    • Coughing blood    • Breath shortness    • Snoring    • CATARACT    • Other specified symptom associated with female genital organs       SEVERE PMS   • Indigestion    • Psychiatric problem      DEPRESSION   • Anesthesia 2011     felt pain even though unconscious   • ASTHMA    • Hiatus hernia syndrome    • Sleep apnea      uses cpap   • Asthma    • Allergic rhinitis    • COPD (chronic obstructive pulmonary disease) (CMS-HCC)    • Depression    • GERD (gastroesophageal reflux disease)    • Obesity    • Pulmonary hypertension (CMS-HCC)    • Chickenpox    • Influenza    • Tonsillitis    • Weight loss    • Wears glasses    • Heartburn    • Cough    • Sputum production    • Shortness of breath    • Daytime sleepiness    • Insomnia        ROS:   Constitutional: Denies fevers, chills, night sweats, fatigue or weight loss  Eyes: Denies vision loss, pain, drainage, double vision  Ears, Nose, Throat: Denies earache, tinnitus, hoarseness. He has some symptoms of chronic rhinosinusitis.  Cardiovascular: Denies chest pain, tightness. Has a history of sinus tachycardia.  Respiratory: See history of present illness  Sleep: See history of present illness  GI: Denies abdominal pain, nausea, vomiting, diarrhea at this time. She has had prior complicated surgery.  : Denies frequent urination, hematuria, painful  "urination  Musculoskeletal: Denies back pain, painful joints, sore muscles  Neurological: Denies headaches, seizures  Skin: Denies rashes, color changes  Psychiatric: Denies depression or thoughts of suicide at this time. Previous history of depression.  Hematologic: Denies bleeding tendency or clotting tendency  Allergic/Immunologic: Denies rhinitis, skin sensitivity    Social History     Social History   • Marital Status: Single     Spouse Name: N/A   • Number of Children: N/A   • Years of Education: N/A     Occupational History   • Not on file.     Social History Main Topics   • Smoking status: Former Smoker -- 0.50 packs/day for 20 years     Types: Cigarettes     Quit date: 01/01/1995   • Smokeless tobacco: Never Used      Comment: 1 ppd, 15 yrs   • Alcohol Use: No   • Drug Use: No   • Sexual Activity: Not on file     Other Topics Concern   • Not on file     Social History Narrative     Other misc and Vancomycin  Current Outpatient Prescriptions on File Prior to Visit   Medication Sig Dispense Refill   • BIOTIN PO Take  by mouth.     • pantoprazole (PROTONIX) 40 MG Tablet Delayed Response Take 40 mg by mouth every day.     • sertraline (ZOLOFT) 50 MG Tab TAKE ONE TABLET BY MOUTH AT BEDTIME 30 Tab 5   • Cholecalciferol (VITAMIN D3) 2000 UNITS CHEW Take  by mouth.     • Multiple Vitamin (MULTI-VITAMIN PO) Take  by mouth.     • CALCIUM PO Take  by mouth.     • VITAMIN E by Does not apply route.     • Albuterol (VENTOLIN INH) Inhale  by mouth.     • Fluticasone Furoate-Vilanterol (BREO ELLIPTA INH) Inhale  by mouth.     • NON SPECIFIED Hydro eye        No current facility-administered medications on file prior to visit.     Blood pressure 128/82, pulse 74, temperature 36.6 °C (97.9 °F), resp. rate 16, height 1.575 m (5' 2\"), weight 120.203 kg (265 lb), SpO2 95 %.  Family History   Problem Relation Age of Onset   • Diabetes     • Heart Disease     • Hypertension     • Stroke     • Cancer     • Lung Disease     • " Heart Disease Father 62     CABG and subsequent valve replacement surgery       Physical Exam:  BMI is 48.46  HEENT: PERRLA, EOMI, no scleral icterus, no nasal or oral lesions  Neck: No thyromegaly, no adenopathy, no bruits  Mallampatti: Status post UPPP  Lungs: Equal breath sounds, no wheezes or crackles  Heart: Regular rate and rhythm, no gallops or murmurs  Abdomen: Soft, benign, no organomegaly  Extremities: No clubbing, cyanosis, or edema  Neurologic: Cranial nerve, motor, and sensory exam are normal    1. Obstructive sleep apnea    2. Dyspnea on exertion    3. Mild persistent asthma without complication    4. Tachycardia    5. Aortic stenosis, mild    6. BMI 45.0-49.9, adult (CMS-HCC)    7. History of splenectomy    8. H/O tracheostomy (CMS-HCC)        Obviously, her dyspnea is multifactorial.  At this point her asthma seems reasonably well controlled. Her symptoms may be due to increased allergies and poor air quality in the [place] area.  I have asked her to use an antihistamine on a more regular basis and will add Singulair to her medication regimen.  I do not think that repeat pulmonary function testing is necessary at this time.    She does need a new mask and supplies which will be ordered.  Her obstructive sleep apnea seems to be well treated at this time.    I have encouraged her to continue with her exercise routine.  We will see her back in 6 months or sooner if there are issues.        Electronically signed by Blake Armenta M.D.  on 07/06/2017    No Recipients

## 2017-07-06 NOTE — MR AVS SNAPSHOT
"[place]   2017 10:00 AM   Office Visit   MRN: [id number]    Department:  [facility]   Dept Phone:  [phone]    Description:  Female : 1960   Provider:  Blake Armenta M.D.           Reason for Visit     New Patient Shortness of breath      Allergies as of 2017     Allergen Noted Reactions    Other Misc 2013       wool    Vancomycin 2013   Rash      You were diagnosed with     Obstructive sleep apnea   [095048]       Dyspnea on exertion   [153936]       Mild persistent asthma without complication   [907785]         Vital Signs     Blood Pressure Pulse Temperature Respirations Height Weight    128/82 mmHg 74 36.6 °C (97.9 °F) 16 1.575 m (5' 2\") 120.203 kg (265 lb)    Body Mass Index Oxygen Saturation Smoking Status             48.46 kg/m2 95% Former Smoker         Basic Information     Date Of Birth Sex Race Ethnicity Preferred Language    1960 Female White Non- English      Your appointments     2017  4:00 PM   Follow Up Med Management with Willard Monroy M.D.   [facility] [address] [place] [address])    [address] [place] [address]  [address]  [facility] [address]   [phone]            2018  4:15 PM   FOLLOW UP with Cesar Johnson M.D.   [facility] (--)    [address] [place] [address] [address]  [facility] [address]   [phone]              Problem List              ICD-10-CM Priority Class Noted - Resolved    Anxiety F41.9   2009 - Present    Allergic rhinitis    2009 - Present    Perimenopausal N95.1   2009 - Present    Incisional hernia K43.2   2013 - Present    Hernia K46.9   2013 - Present    Leukocytosis D72.829   2013 - Present    Tachycardia R00.0   3/16/2017 - Present    Obstructive sleep apnea G47.33   3/16/2017 - Present    Dyspnea on exertion R06.09   3/16/2017 - Present    Aortic stenosis, mild I35.0   2017 - Present      Health Maintenance        Date Due Completion " Dates    IMM DTaP/Tdap/Td Vaccine (1 - Tdap) 12/3/1981 ---    PAP SMEAR 12/3/1983 ---    COLONOSCOPY 12/3/2012 ---    MAMMOGRAM 5/5/2017 5/5/2016, 2/24/2014, 2/14/2013, 2/12/2009, 2/12/2009, 5/10/2007, 5/10/2007, 3/20/2006    IMM INFLUENZA (1) 9/1/2017 ---            Current Immunizations     No immunizations on file.      Below and/or attached are the medications your provider expects you to take. Review all of your home medications and newly ordered medications with your provider and/or pharmacist. Follow medication instructions as directed by your provider and/or pharmacist. Please keep your medication list with you and share with your provider. Update the information when medications are discontinued, doses are changed, or new medications (including over-the-counter products) are added; and carry medication information at all times in the event of emergency situations     Allergies:  OTHER MISC - (reactions not documented)     VANCOMYCIN - Rash               Medications  Valid as of: July 06, 2017 - 10:30 AM    Generic Name Brand Name Tablet Size Instructions for use    Albuterol   Inhale  by mouth.        Biotin   Take  by mouth.        Calcium   Take  by mouth.        Cholecalciferol (Chew Tab) Vitamin D3 2000 UNITS Take  by mouth.        Fluticasone Furoate-Vilanterol   Inhale  by mouth.        Fluticasone Furoate-Vilanterol (AEROSOL POWDER, BREATH ACTIVATED) BREO ELLIPTA 100-25 MCG/INH         Montelukast Sodium (Tab) SINGULAIR 10 MG Take 1 Tab by mouth every evening.        Multiple Vitamin   Take  by mouth.        NON SPECIFIED   Hydro eye         Pantoprazole Sodium (Tablet Delayed Response) PROTONIX 40 MG Take 40 mg by mouth every day.        Sertraline HCl (Tab) ZOLOFT 50 MG TAKE ONE TABLET BY MOUTH AT BEDTIME        Vitamin E   by Does not apply route.        .                 Medicines prescribed today were sent to:     [facility] [address] [place] [address] [address]    [address]  [facility] [address]    Phone: [phone] Fax: [phone]    Open 24 Hours?: No      Medication refill instructions:       If your prescription bottle indicates you have medication refills left, it is not necessary to call your provider’s office. Please contact your pharmacy and they will refill your medication.    If your prescription bottle indicates you do not have any refills left, you may request refills at any time through one of the following ways: The online [employer] system (except Urgent Care), by calling your provider’s office, or by asking your pharmacy to contact your provider’s office with a refill request. Medication refills are processed only during regular business hours and may not be available until the next business day. Your provider may request additional information or to have a follow-up visit with you prior to refilling your medication.   *Please Note: Medication refills are assigned a new Rx number when refilled electronically. Your pharmacy may indicate that no refills were authorized even though a new prescription for the same medication is available at the pharmacy. Please request the medicine by name with the pharmacy before contacting your provider for a refill.           [employer] Access Code: Activation code not generated  Current [employer] Status: Active

## 2017-07-06 NOTE — PROGRESS NOTES
Chief Complaint   Patient presents with   • New Patient     Shortness of breath       HPI:  The patient is a 56-year-old woman who is a previous patient of [facility]. She has a remote smoking history. She has a history of allergies, sinusitis, rhinitis, and asthma. She also has a history of obstructive sleep apnea. In 2011 she had bariatric surgery. Apparently she had postoperative complications which eventually led to reoperation, a splenectomy, and a prolonged course on mechanical ventilation requiring a tracheostomy and care at [facility] after a long hospitalization at [facility].    This spring she has had some increased symptoms of shortness of breath and upper respiratory congestion. She uses fluticasone/vilanterol on a daily basis. She has noticed that she uses her albuterol rescue inhaler a bit more than usual. She has had prior sinus surgery by Dr. Smallwood. Previous PFTs have shown an FEV1 of 80% of predicted improving to 85% of predicted after an inhaled bronchodilator. She is not currently wheezing. At work she usually tries to walk up 2 flights of stairs on a regular basis. Because of her increased symptoms she has not been anemic to be quite as active. She has noted that she's coughing a bit more and bringing up a little bit more sputum. She has no history of fever or chills. She has no history of inspiratory stridor. Her flow volume loop in 2012 showed no abnormalities. She has had an allergy evaluation by Dr. Dewitt.    Her prior sleep study was reviewed. She was treated successfully with CPAP at 7 cm of water pressure. She uses the CPAP on a regular basis and feels that it helps her feel more rested. However, she has not gotten replacement mask and supplies in quite some time. She had a prior UPPP performed by Dr. Smallwood. She has also seen Dr. Pham in the past.    She has been seen by cardiology for a history of tachycardia. She has been increasing her  exercise. Her echocardiogram shows some mild AMS. Diastolic dysfunction was not able to be assessed.    Past Medical History   Diagnosis Date   • Anxiety 6/9/2009   • Allergic rhinitis 6/9/2009   • Perimenopausal 6/9/2009   • Heart burn    • Coughing blood    • Breath shortness    • Snoring    • CATARACT    • Other specified symptom associated with female genital organs       SEVERE PMS   • Indigestion    • Psychiatric problem      DEPRESSION   • Anesthesia 2011     felt pain even though unconscious   • ASTHMA    • Hiatus hernia syndrome    • Sleep apnea      uses cpap   • Asthma    • Allergic rhinitis    • COPD (chronic obstructive pulmonary disease) (CMS-HCC)    • Depression    • GERD (gastroesophageal reflux disease)    • Obesity    • Pulmonary hypertension (CMS-HCC)    • Chickenpox    • Influenza    • Tonsillitis    • Weight loss    • Wears glasses    • Heartburn    • Cough    • Sputum production    • Shortness of breath    • Daytime sleepiness    • Insomnia        ROS:   Constitutional: Denies fevers, chills, night sweats, fatigue or weight loss  Eyes: Denies vision loss, pain, drainage, double vision  Ears, Nose, Throat: Denies earache, tinnitus, hoarseness. He has some symptoms of chronic rhinosinusitis.  Cardiovascular: Denies chest pain, tightness. Has a history of sinus tachycardia.  Respiratory: See history of present illness  Sleep: See history of present illness  GI: Denies abdominal pain, nausea, vomiting, diarrhea at this time. She has had prior complicated surgery.  : Denies frequent urination, hematuria, painful urination  Musculoskeletal: Denies back pain, painful joints, sore muscles  Neurological: Denies headaches, seizures  Skin: Denies rashes, color changes  Psychiatric: Denies depression or thoughts of suicide at this time. Previous history of depression.  Hematologic: Denies bleeding tendency or clotting tendency  Allergic/Immunologic: Denies rhinitis, skin sensitivity    Social History  "    Social History   • Marital Status: Single     Spouse Name: N/A   • Number of Children: N/A   • Years of Education: N/A     Occupational History   • Not on file.     Social History Main Topics   • Smoking status: Former Smoker -- 0.50 packs/day for 20 years     Types: Cigarettes     Quit date: 01/01/1995   • Smokeless tobacco: Never Used      Comment: 1 ppd, 15 yrs   • Alcohol Use: No   • Drug Use: No   • Sexual Activity: Not on file     Other Topics Concern   • Not on file     Social History Narrative     Other misc and Vancomycin  Current Outpatient Prescriptions on File Prior to Visit   Medication Sig Dispense Refill   • BIOTIN PO Take  by mouth.     • pantoprazole (PROTONIX) 40 MG Tablet Delayed Response Take 40 mg by mouth every day.     • sertraline (ZOLOFT) 50 MG Tab TAKE ONE TABLET BY MOUTH AT BEDTIME 30 Tab 5   • Cholecalciferol (VITAMIN D3) 2000 UNITS CHEW Take  by mouth.     • Multiple Vitamin (MULTI-VITAMIN PO) Take  by mouth.     • CALCIUM PO Take  by mouth.     • VITAMIN E by Does not apply route.     • Albuterol (VENTOLIN INH) Inhale  by mouth.     • Fluticasone Furoate-Vilanterol (BREO ELLIPTA INH) Inhale  by mouth.     • NON SPECIFIED Hydro eye        No current facility-administered medications on file prior to visit.     Blood pressure 128/82, pulse 74, temperature 36.6 °C (97.9 °F), resp. rate 16, height 1.575 m (5' 2\"), weight 120.203 kg (265 lb), SpO2 95 %.  Family History   Problem Relation Age of Onset   • Diabetes     • Heart Disease     • Hypertension     • Stroke     • Cancer     • Lung Disease     • Heart Disease Father 62     CABG and subsequent valve replacement surgery       Physical Exam:  BMI is 48.46  HEENT: PERRLA, EOMI, no scleral icterus, no nasal or oral lesions  Neck: No thyromegaly, no adenopathy, no bruits  Mallampatti: Status post UPPP  Lungs: Equal breath sounds, no wheezes or crackles  Heart: Regular rate and rhythm, no gallops or murmurs  Abdomen: Soft, benign, no " organomegaly  Extremities: No clubbing, cyanosis, or edema  Neurologic: Cranial nerve, motor, and sensory exam are normal    1. Obstructive sleep apnea    2. Dyspnea on exertion    3. Mild persistent asthma without complication    4. Tachycardia    5. Aortic stenosis, mild    6. BMI 45.0-49.9, adult (CMS-HCC)    7. History of splenectomy    8. H/O tracheostomy (CMS-HCC)        Obviously, her dyspnea is multifactorial.  At this point her asthma seems reasonably well controlled. Her symptoms may be due to increased allergies and poor air quality in the [place] area.  I have asked her to use an antihistamine on a more regular basis and will add Singulair to her medication regimen.  I do not think that repeat pulmonary function testing is necessary at this time.    She does need a new mask and supplies which will be ordered.  Her obstructive sleep apnea seems to be well treated at this time.    I have encouraged her to continue with her exercise routine.  We will see her back in 6 months or sooner if there are issues.

## 2017-07-07 ENCOUNTER — TELEPHONE (OUTPATIENT)
Dept: PULMONOLOGY | Facility: HOSPICE | Age: 57
End: 2017-07-07

## 2017-07-07 NOTE — TELEPHONE ENCOUNTER
I called patient to let her know [employer] will be supplying her mask and supplies. Order faxed with notes and demos.

## 2017-07-20 ENCOUNTER — OFFICE VISIT (OUTPATIENT)
Dept: BEHAVIORAL HEALTH | Facility: PHYSICIAN GROUP | Age: 57
End: 2017-07-20
Payer: COMMERCIAL

## 2017-07-20 VITALS
DIASTOLIC BLOOD PRESSURE: 80 MMHG | SYSTOLIC BLOOD PRESSURE: 130 MMHG | HEIGHT: 62 IN | WEIGHT: 270 LBS | HEART RATE: 82 BPM | BODY MASS INDEX: 49.69 KG/M2 | TEMPERATURE: 98.1 F | RESPIRATION RATE: 16 BRPM

## 2017-07-20 DIAGNOSIS — F33.0 MILD EPISODE OF RECURRENT MAJOR DEPRESSIVE DISORDER (HCC): ICD-10-CM

## 2017-07-20 PROCEDURE — 99213 OFFICE O/P EST LOW 20 MIN: CPT | Performed by: PSYCHIATRY & NEUROLOGY

## 2017-07-20 PROCEDURE — 90833 PSYTX W PT W E/M 30 MIN: CPT | Performed by: PSYCHIATRY & NEUROLOGY

## 2017-07-21 NOTE — PROGRESS NOTES
"[facility]  PSYCHIATRIC FOLLOW-UP NOTE    Name: Rita ALCALA Groom  MRN: [id number]  : 1960  Age: 56 y.o.  Date of assessment: 2017  PCP: DANNIELLE Ricardo  Persons in attendance: Patient  REASON FOR VISIT/CHIEF COMPLAINT (as stated by Patient):  Rita ALCALA Groom is a 56 y.o., White female, attending follow-up appointment for   Chief Complaint   Patient presents with   • Medication Management     The patient is seen today for follow up on her depression and anxiety.   .      HISTORY OF PRESENT ILLNESS: This is a 55 yo female, single lives alone seen today for follow up. The patient has been taking her sertraline and she has been feeling better. The patient saw he pulmonary doctor and they recommended inhaler. The patient reported palpitation an she saw a cardiologist. The patient has Cpap machine but she is not using it as prescribed. The patient has been feeling tried all the time and she gets short of breath.    PSYCHOSOCIAL CHANGES SINCE PREVIOUS CONTACT:  The patient denied depression but feel tired all the time.     TRESPONSE TO TREATMENT:  Fair.    MEDICATION SIDE EFFECTS:  Denied.    REVIEW OF SYSTEMS:        Constitutional positive - obesity   Eyes positive - cataract.   Ears/Nose/Mouth/Throat positive - allergic rhinitis   Cardiovascular negative   Respiratory positive - sleep apnea, asthma.   Gastrointestinal positive - gastro esophageal reflex disease   Genitourinary negative   Muscular negative   Integumentary negative   Neurological negative   Endocrine negative   Hematologic/Lymphatic negative       PSYCHIATRIC EXAMINATION/MENTAL STATUS  /80 mmHg  Pulse 82  Temp(Src) 36.7 °C (98.1 °F)  Resp 16  Ht 1.575 m (5' 2.01\")  Wt 122.471 kg (270 lb)  BMI 49.37 kg/m2  Participation: Active verbal participation, Attentive and Engaged  Grooming:Casual  Orientation: Alert and Fully Oriented  Eye contact: Good  Behavior:Calm   Mood: Anxious  Affect: Flexible  Thought " process: Logical and Goal-directed  Thought content:  Within normal limits  Speech: Rate within normal limits and Volume within normal limits  Perception:  Within normal limits  Memory:  No gross evidence of memory deficits  Insight: Good  Judgment: Good  Family/couple interaction observations:   Other:    Current risk:    Suicide: Low   Homicide: Low   Self-harm: Low  Relapse: Low  Other:   Crisis Safety Plan reviewed?Yes  If evidence of imminent risk is present, intervention/plan:    Medical Records/Labs/Diagnostic Tests Reviewed: yes.    Medical Records/Labs/Diagnostic Tests Ordered: none.    DIAGNOSTIC IMPRESSION(S):  1. Mild episode of recurrent major depressive disorder (CMS-[facility])           ASSESSMENT AND PLAN:    1. Major depression  Sertraline 50 mg per day    2. Follow up in three months.    16 minutes were spent in psychotherapy.  (If greater than 16 minutes, add 59289 code)   Topics addressed in psychotherapy include:  We focused on improving her self esteem and self care.  We talked about limiting her self destructive behavior and she agreed to use C Pap machine.  We focused on maximizing her daily function   We used clarification and suggestion.  Willard Monroy M.D.

## 2017-10-18 ENCOUNTER — OFFICE VISIT (OUTPATIENT)
Dept: BEHAVIORAL HEALTH | Facility: PHYSICIAN GROUP | Age: 57
End: 2017-10-18
Payer: COMMERCIAL

## 2017-10-18 VITALS
RESPIRATION RATE: 16 BRPM | BODY MASS INDEX: 49.69 KG/M2 | WEIGHT: 270 LBS | HEART RATE: 78 BPM | DIASTOLIC BLOOD PRESSURE: 82 MMHG | SYSTOLIC BLOOD PRESSURE: 132 MMHG | HEIGHT: 62 IN | TEMPERATURE: 98.1 F

## 2017-10-18 DIAGNOSIS — F33.0 MILD EPISODE OF RECURRENT MAJOR DEPRESSIVE DISORDER (HCC): ICD-10-CM

## 2017-10-18 PROCEDURE — 99213 OFFICE O/P EST LOW 20 MIN: CPT | Performed by: PSYCHIATRY & NEUROLOGY

## 2017-10-18 PROCEDURE — 90833 PSYTX W PT W E/M 30 MIN: CPT | Performed by: PSYCHIATRY & NEUROLOGY

## 2017-10-18 ASSESSMENT — PATIENT HEALTH QUESTIONNAIRE - PHQ9
7. TROUBLE CONCENTRATING ON THINGS, SUCH AS READING THE NEWSPAPER OR WATCHING TELEVISION: 1
SUM OF ALL RESPONSES TO PHQ QUESTIONS 1-9: 8
2. FEELING DOWN, DEPRESSED, IRRITABLE, OR HOPELESS: 1
5. POOR APPETITE OR OVEREATING: 1
3. TROUBLE FALLING OR STAYING ASLEEP OR SLEEPING TOO MUCH: 1
SUM OF ALL RESPONSES TO PHQ9 QUESTIONS 1 AND 2: 2
1. LITTLE INTEREST OR PLEASURE IN DOING THINGS: 1
8. MOVING OR SPEAKING SO SLOWLY THAT OTHER PEOPLE COULD HAVE NOTICED. OR THE OPPOSITE, BEING SO FIGETY OR RESTLESS THAT YOU HAVE BEEN MOVING AROUND A LOT MORE THAN USUAL: 1
9. THOUGHTS THAT YOU WOULD BE BETTER OFF DEAD, OR OF HURTING YOURSELF: 0
6. FEELING BAD ABOUT YOURSELF - OR THAT YOU ARE A FAILURE OR HAVE LET YOURSELF OR YOUR FAMILY DOWN: 1
4. FEELING TIRED OR HAVING LITTLE ENERGY: 1

## 2017-10-18 NOTE — PROGRESS NOTES
"[facility]  PSYCHIATRIC FOLLOW-UP NOTE    Name: Rita ALCALA Commerce  MRN: [id number]  : 1960  Age: 56 y.o.  Date of assessment: 10/18/2017  PCP: DANNIELLE Ricardo  Persons in attendance: Patient  REASON FOR VISIT/CHIEF COMPLAINT (as stated by Patient):  Rita ALCALA Commerce is a 56 y.o., White female, attending follow-up appointment for   Chief Complaint   Patient presents with   • Medication Management     The patient is seen today for follow up and she is doing well on her medications.   .      HISTORY OF PRESENT ILLNESS:    This is a 55 yo female, seen today for follow up. The patient has been working full time and she has been feeling well. The patient has been taking sertraline 50 mg one a day. The patient has been sleeping well. The patient denied palpitation and chest pain. The patient has been feeling tired all the time.      PSYCHOSOCIAL CHANGES SINCE PREVIOUS CONTACT:  The patients depression improved    RESPONSE TO TREATMENT:  good    MEDICATION SIDE EFFECTS:  Denied.    REVIEW OF SYSTEMS:        Constitutional positive - obesity   Eyes positive - cataract   Ears/Nose/Mouth/Throat positive - allergic rhinitis   Cardiovascular negative   Respiratory positive - sleep apnea   Gastrointestinal positive - gastroesophageal reflex disease   Genitourinary negative   Muscular negative   Integumentary negative   Neurological negative   Endocrine negative   Hematologic/Lymphatic negative       PSYCHIATRIC EXAMINATION/MENTAL STATUS  /82   Pulse 78   Temp 36.7 °C (98.1 °F)   Resp 16   Ht 1.575 m (5' 2.01\")   Wt 122.5 kg (270 lb)   BMI 49.37 kg/m²   Participation: Active verbal participation, Attentive and Engaged  Grooming:Neat  Orientation: Alert and Fully Oriented  Eye contact: Good  Behavior:Calm   Mood: Anxious  Affect: Flexible and Full range  Thought process: Logical and Goal-directed  Thought content:  Within normal limits  Speech: Rate within normal limits and Volume within " normal limits  Perception:  Within normal limits  Memory:  No gross evidence of memory deficits  Insight: Good  Judgment: Good  Family/couple interaction observations:   Other:    Current risk:    Suicide: Low   Homicide: Low   Self-harm: Low  Relapse: Low  Other:   Crisis Safety Plan reviewed?Yes  If evidence of imminent risk is present, intervention/plan:    Medical Records/Labs/Diagnostic Tests Reviewed: yes.    Medical Records/Labs/Diagnostic Tests Ordered: none.    DIAGNOSTIC IMPRESSION(S):  1. Mild episode of recurrent major depressive disorder (CMS-[facility])           ASSESSMENT AND PLAN:    1. Major depression  Sertraline 50 mg one a day.    2. Follow up in two months.    16 minutes were spent in psychotherapy.  (If greater than 16 minutes, add 32091 code)   Topics addressed in psychotherapy include:  We talked about identifying her negative automatic thoughts and try to change it.  She is keeping a daily routine with a good sleep cycle.  The patient is improving her self esteem.  Willard Monroy M.D.

## 2018-01-04 ENCOUNTER — OFFICE VISIT (OUTPATIENT)
Dept: BEHAVIORAL HEALTH | Facility: PHYSICIAN GROUP | Age: 58
End: 2018-01-04
Payer: COMMERCIAL

## 2018-01-04 VITALS
BODY MASS INDEX: 48.95 KG/M2 | HEIGHT: 62 IN | DIASTOLIC BLOOD PRESSURE: 80 MMHG | RESPIRATION RATE: 16 BRPM | HEART RATE: 76 BPM | SYSTOLIC BLOOD PRESSURE: 128 MMHG | TEMPERATURE: 98.2 F | WEIGHT: 266 LBS

## 2018-01-04 DIAGNOSIS — F33.0 MILD EPISODE OF RECURRENT MAJOR DEPRESSIVE DISORDER (HCC): ICD-10-CM

## 2018-01-04 PROCEDURE — 90833 PSYTX W PT W E/M 30 MIN: CPT | Performed by: PSYCHIATRY & NEUROLOGY

## 2018-01-04 PROCEDURE — 99213 OFFICE O/P EST LOW 20 MIN: CPT | Performed by: PSYCHIATRY & NEUROLOGY

## 2018-01-04 ASSESSMENT — PATIENT HEALTH QUESTIONNAIRE - PHQ9
8. MOVING OR SPEAKING SO SLOWLY THAT OTHER PEOPLE COULD HAVE NOTICED. OR THE OPPOSITE, BEING SO FIGETY OR RESTLESS THAT YOU HAVE BEEN MOVING AROUND A LOT MORE THAN USUAL: 0
5. POOR APPETITE OR OVEREATING: 1
9. THOUGHTS THAT YOU WOULD BE BETTER OFF DEAD, OR OF HURTING YOURSELF: 0
7. TROUBLE CONCENTRATING ON THINGS, SUCH AS READING THE NEWSPAPER OR WATCHING TELEVISION: 0
3. TROUBLE FALLING OR STAYING ASLEEP OR SLEEPING TOO MUCH: 1
6. FEELING BAD ABOUT YOURSELF - OR THAT YOU ARE A FAILURE OR HAVE LET YOURSELF OR YOUR FAMILY DOWN: 1
1. LITTLE INTEREST OR PLEASURE IN DOING THINGS: 1
SUM OF ALL RESPONSES TO PHQ9 QUESTIONS 1 AND 2: 2
SUM OF ALL RESPONSES TO PHQ QUESTIONS 1-9: 6
2. FEELING DOWN, DEPRESSED, IRRITABLE, OR HOPELESS: 1
4. FEELING TIRED OR HAVING LITTLE ENERGY: 1

## 2018-01-05 NOTE — PROGRESS NOTES
"[facility]  PSYCHIATRIC FOLLOW-UP NOTE    Name: Rita ALCALA Belen  MRN: [id number]  : 1960  Age: 57 y.o.  Date of assessment: 2018  PCP: DANNIELLE Ricardo  Persons in attendance: Patient  REASON FOR VISIT/CHIEF COMPLAINT (as stated by Patient):  Rita ALCALA Belen is a 57 y.o., White female, attending follow-up appointment for   Chief Complaint   Patient presents with   • Medication Management     I had a nice vacation and I am back to work.   .      HISTORY OF PRESENT ILLNESS:    This is a 57 y.o. Female, employed, seen today for follow up. The patient had a good vacation and she is back to work. The patient is sleeping well and her depression improved. The patient reported burn out feeling at the end of the day. The patient is sleeping well at night.     PSYCHOSOCIAL CHANGES SINCE PREVIOUS CONTACT:  The patient denied depression.    RESPONSE TO TREATMENT:  She is taking sertraline 50 mg o    MEDICATION SIDE EFFECTS:  Denied.    REVIEW OF SYSTEMS:        Constitutional positive - obesity   Eyes positive - cataract   Ears/Nose/Mouth/Throat positive - allergic rhinitis   Cardiovascular negative   Respiratory positive - sleep apnea   Gastrointestinal positive - gastroesophageal reflex disease.   Genitourinary negative   Muscular negative   Integumentary negative   Neurological negative   Endocrine negative   Hematologic/Lymphatic negative       PSYCHIATRIC EXAMINATION/MENTAL STATUS  /80   Pulse 76   Temp 36.8 °C (98.2 °F)   Resp 16   Ht 1.575 m (5' 2.01\")   Wt 120.7 kg (266 lb)   BMI 48.64 kg/m²   Participation: Active verbal participation, Attentive and Engaged  Grooming:Neat  Orientation: Alert and Fully Oriented  Eye contact: Good  Behavior:Calm   Mood: Euthymic  Affect: Flexible and Full range  Thought process: Logical and Goal-directed  Thought content:  Within normal limits  Speech: Rate within normal limits and Volume within normal limits  Perception:  Within normal " limits  Memory:  No gross evidence of memory deficits  Insight: Good  Judgment: Good  Family/couple interaction observations:   Other:    Current risk:    Suicide: Low   Homicide: Low   Self-harm: Low  Relapse: Low  Other:   Crisis Safety Plan reviewed?Yes  If evidence of imminent risk is present, intervention/plan:    Medical Records/Labs/Diagnostic Tests Reviewed: yes.    Medical Records/Labs/Diagnostic Tests Ordered: none.    DIAGNOSTIC IMPRESSION(S):  1. Mild episode of recurrent major depressive disorder (CMS-[facility])           ASSESSMENT AND PLAN:  1. Major depression  Sertraline 50 mg one a day.    2. Follow up in two months.    16 minutes were spent in psychotherapy.  (If greater than 16 minutes, add 50167 code)   Topics addressed in psychotherapy include:  The patient has a daily routine and a good sleep cycle.  Cognitive restructuring and behavioral changes.  Encouraged meditation and relaxation method.  Willard Monroy M.D.

## 2018-02-06 ENCOUNTER — OFFICE VISIT (OUTPATIENT)
Dept: CARDIOLOGY | Facility: MEDICAL CENTER | Age: 58
End: 2018-02-06
Payer: COMMERCIAL

## 2018-02-06 VITALS
HEIGHT: 62 IN | BODY MASS INDEX: 49.69 KG/M2 | HEART RATE: 94 BPM | WEIGHT: 270 LBS | DIASTOLIC BLOOD PRESSURE: 80 MMHG | SYSTOLIC BLOOD PRESSURE: 126 MMHG

## 2018-02-06 DIAGNOSIS — R00.0 TACHYCARDIA: ICD-10-CM

## 2018-02-06 DIAGNOSIS — R06.09 DYSPNEA ON EXERTION: ICD-10-CM

## 2018-02-06 DIAGNOSIS — I35.0 AORTIC STENOSIS, MILD: ICD-10-CM

## 2018-02-06 DIAGNOSIS — G47.33 OBSTRUCTIVE SLEEP APNEA: ICD-10-CM

## 2018-02-06 PROCEDURE — 99214 OFFICE O/P EST MOD 30 MIN: CPT | Performed by: INTERNAL MEDICINE

## 2018-02-06 NOTE — LETTER
[facility]   [address], [address]  [place] [address]  Phone: [phone]  Fax: [phone]              Rita ALCALA Kelly  1960    Encounter Date: 2/6/2018    Cesar Johnson M.D.          PROGRESS NOTE:  Subjective:   Rita Kelly is a 57 y.o. female who presents today for follow-up of her tachycardia. She also has dyspnea on exertion and a history of obstructive sleep apnea.    She was having difficulty with sinus tachycardia. We discussed increasing her activity level and getting an activity tractker. She is getting them between 7226-6161 steps daily. She is going for a walk a couple times a week for about 30 minutes.    She feels she can walk a couple 200 yards before she is dyspneic. This is some improvement from her prior activity level. She is using her CPAP at night. She has had no PND or orthopnea.    She's noted no chest discomfort, edema, palpitations or lightheadedness.    Past Medical History:   Diagnosis Date   • Allergic rhinitis 6/9/2009   • Allergic rhinitis    • Anesthesia 2011    felt pain even though unconscious   • Anxiety 6/9/2009   • ASTHMA    • Asthma    • Breath shortness    • CATARACT    • Chickenpox    • COPD (chronic obstructive pulmonary disease) (CMS-[facility])    • Cough    • Coughing blood    • Daytime sleepiness    • Depression    • GERD (gastroesophageal reflux disease)    • Heart burn    • Heartburn    • Hiatus hernia syndrome    • Indigestion    • Influenza    • Insomnia    • Obesity    • Other specified symptom associated with female genital organs      SEVERE PMS   • Perimenopausal 6/9/2009   • Psychiatric problem     DEPRESSION   • Pulmonary hypertension    • Shortness of breath    • Sleep apnea     uses cpap   • Snoring    • Sputum production    • Tonsillitis    • Wears glasses    • Weight loss      Past Surgical History:   Procedure Laterality Date   • VENTRAL HERNIA REPAIR LAPAROSCOPIC  4/12/2013    Performed by Farhad  NEVA Ignacio M.D. at SURGERY [facility] ORS   • SPLENECTOMY  2011   • GASTROSCOPY  2011    several with stent placement/removal   • GASTROSCOPY  9/9/2010    Performed by MARQUIS QURESHI at SURGERY [facility] ORS   • UVULOPHARYNGOPALATOPLASTY  3/17/2010    Performed by JALEN CHRISTY at SURGERY SAME DAY [facility] ORS   • TONSILLECTOMY  3/17/2010    Performed by JALEN CHRISTY at SURGERY SAME DAY [facility] ORS   • ANTROSTOMY  3/17/2010    Performed by JALEN CHRISTY at SURGERY SAME DAY [facility] ORS   • ETHMOIDECTOMY  3/17/2010    Performed by JALEN CHRISTY at SURGERY SAME DAY [facility] ORS   • TURBINOPLASTY  3/17/2010    Performed by JALEN CHRISTY at SURGERY SAME DAY [facility] ORS   • GASTRIC SLEEVE LAPAROSCOPY  2010   • CATARACT EXTRACTION WITH IOL  2008    bilat   • VU BY LAPAROSCOPY     • HIATAL HERNIA REPAIR     • PB REMV 2ND CATARACT,CORN-SCLER SECTN     • SLEEVE,MATHEW VASO THIGH     • TRACHEOSTOMY       Family History   Problem Relation Age of Onset   • Heart Disease Father 62     CABG and subsequent valve replacement surgery   • Diabetes     • Heart Disease     • Hypertension     • Stroke     • Cancer     • Lung Disease       History   Smoking Status   • Former Smoker   • Packs/day: 0.50   • Years: 20.00   • Types: Cigarettes   • Quit date: 1/1/1995   Smokeless Tobacco   • Never Used     Comment: 1 ppd, 15 yrs     Allergies   Allergen Reactions   • Other Misc      wool   • Vancomycin Rash     Outpatient Encounter Prescriptions as of 2/6/2018   Medication Sig Dispense Refill   • aspirin EC (ECOTRIN) 81 MG Tablet Delayed Response Take 81 mg by mouth every day.     • sertraline (ZOLOFT) 50 MG Tab TAKE ONE TABLET BY MOUTH AT BEDTIME 30 Tab 5   • BREO ELLIPTA 100-25 MCG/INH AEROSOL POWDER, BREATH ACTIVATED      • montelukast (SINGULAIR) 10 MG Tab Take 1 Tab by mouth every evening. 30 Tab 11   • pantoprazole (PROTONIX) 40 MG Tablet Delayed Response Take 40 mg by mouth every day.     •  "Cholecalciferol (VITAMIN D3) 2000 UNITS CHEW Take  by mouth.     • Multiple Vitamin (MULTI-VITAMIN PO) Take  by mouth.     • CALCIUM PO Take  by mouth.     • NON SPECIFIED Hydro eye      • VITAMIN E by Does not apply route.     • Albuterol (VENTOLIN INH) Inhale  by mouth.     • Fluticasone Furoate-Vilanterol (BREO ELLIPTA INH) Inhale  by mouth.     • BIOTIN PO Take  by mouth.       No facility-administered encounter medications on file as of 2/6/2018.      ROS       Objective:   /80   Pulse 94   Ht 1.575 m (5' 2\")   Wt 122.5 kg (270 lb)   BMI 49.38 kg/m²      Physical Exam   Neck: No JVD present.   Cardiovascular: Normal rate and regular rhythm.  Exam reveals no gallop.    Murmur (2 to 3/6 systolic at the base) heard.  Pulmonary/Chest: Effort normal. She has no rales.   Abdominal: Soft. There is no tenderness.   Musculoskeletal: She exhibits no edema.     Echocardiogram:  CONCLUSIONS  No prior study is available for comparison.   Normal left ventricular chamber size. Normal left ventricular wall   thickness. Normal left ventricular systolic function. Left ventricular   ejection fraction is visually estimated to be 65%. A reliable   estimation of diastolic function cannot be made due to conflicting   data.   Mild aortic stenosis. Transvalvular gradients are - Peak:24  mmHg,   Mean: 12 mmHg.  Vmax is 2.44  m/s. Dimensionless index is. 47.  Mild   aortic insufficiency. Valve is not optimally visualized but is probably   tri leaflet. Increased gradient could be do to supra or sub valvular   membrane. Cannot exclude some increased gradient from a hyperdynamic   state.  Exam Date:         04/13/2017       Assessment:     1. Tachycardia     2. Obstructive sleep apnea     3. Dyspnea on exertion     4. Aortic stenosis, mild        Medical Decision Making:  Today's Assessment / Status / Plan:     Ms. Kelly is clinically stable. Her heart rate is under improved control with better physical condition. She continues " to have dyspnea on exertion though this is improved somewhat. We discussed walking at least 30 minutes 3 times a week and being mildly short of breath with this. She is otherwise asymptomatic from a cardiovascular standpoint. She will follow-up in about 6 months.      NIK Ricardo.N.P.  [address]  [facility] [address]  VIA Facsimile: [phone]

## 2018-02-07 NOTE — PROGRESS NOTES
Subjective:   Rita Kelly is a 57 y.o. female who presents today for follow-up of her tachycardia. She also has dyspnea on exertion and a history of obstructive sleep apnea.    She was having difficulty with sinus tachycardia. We discussed increasing her activity level and getting an activity tractker. She is getting them between 1127-2149 steps daily. She is going for a walk a couple times a week for about 30 minutes.    She feels she can walk a couple 200 yards before she is dyspneic. This is some improvement from her prior activity level. She is using her CPAP at night. She has had no PND or orthopnea.    She's noted no chest discomfort, edema, palpitations or lightheadedness.    Past Medical History:   Diagnosis Date   • Allergic rhinitis 6/9/2009   • Allergic rhinitis    • Anesthesia 2011    felt pain even though unconscious   • Anxiety 6/9/2009   • ASTHMA    • Asthma    • Breath shortness    • CATARACT    • Chickenpox    • COPD (chronic obstructive pulmonary disease) (CMS-[facility])    • Cough    • Coughing blood    • Daytime sleepiness    • Depression    • GERD (gastroesophageal reflux disease)    • Heart burn    • Heartburn    • Hiatus hernia syndrome    • Indigestion    • Influenza    • Insomnia    • Obesity    • Other specified symptom associated with female genital organs      SEVERE PMS   • Perimenopausal 6/9/2009   • Psychiatric problem     DEPRESSION   • Pulmonary hypertension    • Shortness of breath    • Sleep apnea     uses cpap   • Snoring    • Sputum production    • Tonsillitis    • Wears glasses    • Weight loss      Past Surgical History:   Procedure Laterality Date   • VENTRAL HERNIA REPAIR LAPAROSCOPIC  4/12/2013    Performed by Farhad Ignacio M.D. at SURGERY [facility]S   • SPLENECTOMY  2011   • GASTROSCOPY  2011    several with stent placement/removal   • GASTROSCOPY  9/9/2010    Performed by MARQUIS QURESHI at SURGERY [facility] ORS   • UVULOPHARYNGOPALATOPLASTY  3/17/2010     Performed by JALEN CHRISTY at SURGERY SAME DAY [facility] ORS   • TONSILLECTOMY  3/17/2010    Performed by JALEN CHRISTY at SURGERY SAME DAY [facility] ORS   • ANTROSTOMY  3/17/2010    Performed by JALEN CHRISTY at SURGERY SAME DAY [facility] ORS   • ETHMOIDECTOMY  3/17/2010    Performed by JALEN CHRISTY at SURGERY SAME DAY [facility] ORS   • TURBINOPLASTY  3/17/2010    Performed by JALEN CHRISTY at SURGERY SAME DAY [facility] ORS   • GASTRIC SLEEVE LAPAROSCOPY  2010   • CATARACT EXTRACTION WITH IOL  2008    bilat   • VU BY LAPAROSCOPY     • HIATAL HERNIA REPAIR     • PB REMV 2ND CATARACT,CORN-SCLER SECTN     • SLEEVE,AMTHEW VASO THIGH     • TRACHEOSTOMY       Family History   Problem Relation Age of Onset   • Heart Disease Father 62     CABG and subsequent valve replacement surgery   • Diabetes     • Heart Disease     • Hypertension     • Stroke     • Cancer     • Lung Disease       History   Smoking Status   • Former Smoker   • Packs/day: 0.50   • Years: 20.00   • Types: Cigarettes   • Quit date: 1/1/1995   Smokeless Tobacco   • Never Used     Comment: 1 ppd, 15 yrs     Allergies   Allergen Reactions   • Other Misc      wool   • Vancomycin Rash     Outpatient Encounter Prescriptions as of 2/6/2018   Medication Sig Dispense Refill   • aspirin EC (ECOTRIN) 81 MG Tablet Delayed Response Take 81 mg by mouth every day.     • sertraline (ZOLOFT) 50 MG Tab TAKE ONE TABLET BY MOUTH AT BEDTIME 30 Tab 5   • BREO ELLIPTA 100-25 MCG/INH AEROSOL POWDER, BREATH ACTIVATED      • montelukast (SINGULAIR) 10 MG Tab Take 1 Tab by mouth every evening. 30 Tab 11   • pantoprazole (PROTONIX) 40 MG Tablet Delayed Response Take 40 mg by mouth every day.     • Cholecalciferol (VITAMIN D3) 2000 UNITS CHEW Take  by mouth.     • Multiple Vitamin (MULTI-VITAMIN PO) Take  by mouth.     • CALCIUM PO Take  by mouth.     • NON SPECIFIED Hydro eye      • VITAMIN E by Does not apply route.     • Albuterol (VENTOLIN INH) Inhale   "by mouth.     • Fluticasone Furoate-Vilanterol (BREO ELLIPTA INH) Inhale  by mouth.     • BIOTIN PO Take  by mouth.       No facility-administered encounter medications on file as of 2/6/2018.      ROS       Objective:   /80   Pulse 94   Ht 1.575 m (5' 2\")   Wt 122.5 kg (270 lb)   BMI 49.38 kg/m²     Physical Exam   Neck: No JVD present.   Cardiovascular: Normal rate and regular rhythm.  Exam reveals no gallop.    Murmur (2 to 3/6 systolic at the base) heard.  Pulmonary/Chest: Effort normal. She has no rales.   Abdominal: Soft. There is no tenderness.   Musculoskeletal: She exhibits no edema.     Echocardiogram:  CONCLUSIONS  No prior study is available for comparison.   Normal left ventricular chamber size. Normal left ventricular wall   thickness. Normal left ventricular systolic function. Left ventricular   ejection fraction is visually estimated to be 65%. A reliable   estimation of diastolic function cannot be made due to conflicting   data.   Mild aortic stenosis. Transvalvular gradients are - Peak:24  mmHg,   Mean: 12 mmHg.  Vmax is 2.44  m/s. Dimensionless index is. 47.  Mild   aortic insufficiency. Valve is not optimally visualized but is probably   tri leaflet. Increased gradient could be do to supra or sub valvular   membrane. Cannot exclude some increased gradient from a hyperdynamic   state.  Exam Date:         04/13/2017       Assessment:     1. Tachycardia     2. Obstructive sleep apnea     3. Dyspnea on exertion     4. Aortic stenosis, mild        Medical Decision Making:  Today's Assessment / Status / Plan:     Ms. Kelly is clinically stable. Her heart rate is under improved control with better physical condition. She continues to have dyspnea on exertion though this has improved somewhat. We discussed walking at least 30 minutes 3 times a week and being mildly short of breath with this. She is otherwise asymptomatic from a cardiovascular standpoint. She will follow-up in about 6 " months.

## 2018-03-21 ENCOUNTER — OFFICE VISIT (OUTPATIENT)
Dept: BEHAVIORAL HEALTH | Facility: PHYSICIAN GROUP | Age: 58
End: 2018-03-21
Payer: COMMERCIAL

## 2018-03-21 VITALS
RESPIRATION RATE: 16 BRPM | TEMPERATURE: 98.2 F | WEIGHT: 270 LBS | HEART RATE: 80 BPM | HEIGHT: 62 IN | SYSTOLIC BLOOD PRESSURE: 126 MMHG | DIASTOLIC BLOOD PRESSURE: 82 MMHG | BODY MASS INDEX: 49.69 KG/M2

## 2018-03-21 DIAGNOSIS — F33.0 MILD EPISODE OF RECURRENT MAJOR DEPRESSIVE DISORDER (HCC): ICD-10-CM

## 2018-03-21 PROCEDURE — 90833 PSYTX W PT W E/M 30 MIN: CPT | Performed by: PSYCHIATRY & NEUROLOGY

## 2018-03-21 PROCEDURE — 99213 OFFICE O/P EST LOW 20 MIN: CPT | Performed by: PSYCHIATRY & NEUROLOGY

## 2018-03-21 ASSESSMENT — PATIENT HEALTH QUESTIONNAIRE - PHQ9
5. POOR APPETITE OR OVEREATING: 1
4. FEELING TIRED OR HAVING LITTLE ENERGY: 1
7. TROUBLE CONCENTRATING ON THINGS, SUCH AS READING THE NEWSPAPER OR WATCHING TELEVISION: 0
1. LITTLE INTEREST OR PLEASURE IN DOING THINGS: 1
6. FEELING BAD ABOUT YOURSELF - OR THAT YOU ARE A FAILURE OR HAVE LET YOURSELF OR YOUR FAMILY DOWN: 0
8. MOVING OR SPEAKING SO SLOWLY THAT OTHER PEOPLE COULD HAVE NOTICED. OR THE OPPOSITE, BEING SO FIGETY OR RESTLESS THAT YOU HAVE BEEN MOVING AROUND A LOT MORE THAN USUAL: 0
SUM OF ALL RESPONSES TO PHQ QUESTIONS 1-9: 5
3. TROUBLE FALLING OR STAYING ASLEEP OR SLEEPING TOO MUCH: 1
SUM OF ALL RESPONSES TO PHQ9 QUESTIONS 1 AND 2: 2
9. THOUGHTS THAT YOU WOULD BE BETTER OFF DEAD, OR OF HURTING YOURSELF: 0
2. FEELING DOWN, DEPRESSED, IRRITABLE, OR HOPELESS: 1

## 2018-03-21 NOTE — PROGRESS NOTES
"[facility]  PSYCHIATRIC FOLLOW-UP NOTE    Name: Rita ALCALA Wilton  MRN: [id number]  : 1960  Age: 57 y.o.  Date of assessment: 3/21/2018  PCP: DANNIELLE Ricardo  Persons in attendance: Patient  REASON FOR VISIT/CHIEF COMPLAINT (as stated by Patient):  Rita ALCALA Wilton is a 57 y.o., White female, attending follow-up appointment for   Chief Complaint   Patient presents with   • Medication Management     I talked to insurance and they approved one visit. I have been doing well on my medications.   .      HISTORY OF PRESENT ILLNESS:    This is 56 yo female, single, employed, seen for follow up. The patient insurance does not cover [facility] and this is last visit. The patient is doing well and she denied depression. The patient is sleeping well at nigt.    PSYCHOSOCIAL CHANGES SINCE PREVIOUS CONTACT:  The patients mood has bene stable and she is sleeping well at night.     RESPONSE TO TREATMENT:  Sertraline 50 mg every day.    MEDICATION SIDE EFFECTS:  Denied.    REVIEW OF SYSTEMS:        Constitutional positive - obesity   Eyes positive - cataract   Ears/Nose/Mouth/Throat negative   Cardiovascular negative   Respiratory positive - sleep apnea   Gastrointestinal positive - gastroesophageal reflex disease   Genitourinary negative   Muscular negative   Integumentary negative   Neurological negative   Endocrine negative   Hematologic/Lymphatic negative       PSYCHIATRIC EXAMINATION/MENTAL STATUS  /82   Pulse 80   Temp 36.8 °C (98.2 °F)   Resp 16   Ht 1.575 m (5' 2.01\")   Wt 122.5 kg (270 lb)   BMI 49.37 kg/m²   Participation: Active verbal participation, Attentive and Engaged  Grooming:Neat  Orientation: Alert and Fully Oriented  Eye contact: Good  Behavior:Calm   Mood: Anxious  Affect: Flexible and Full range  Thought process: Logical and Goal-directed  Thought content:  Within normal limits  Speech: Rate within normal limits and Volume within normal limits  Perception:  Within normal " limits  Memory:  No gross evidence of memory deficits  Insight: Good  Judgment: Good  Family/couple interaction observations:   Other:    Current risk:    Suicide: Low   Homicide: Low   Self-harm: Low  Relapse: Low  Other:   Crisis Safety Plan reviewed?Yes  If evidence of imminent risk is present, intervention/plan:    Medical Records/Labs/Diagnostic Tests Reviewed: yes.    Medical Records/Labs/Diagnostic Tests Ordered: none.    DIAGNOSTIC IMPRESSION(S):  1. Mild episode of recurrent major depressive disorder (CMS-[facility])           ASSESSMENT AND PLAN:  1. Major depression.  Sertraline 50 mg one a day # 90 refills three.    2. Follow up as needed.    16 minutes were spent in psychotherapy.  (If greater than 16 minutes, add 93210 code)   Topics addressed in psychotherapy include:  The patients insurance changed and we talked about  Termination of treatment.  The patient has list of psychiatrist.  Agreed to make an appointment.  Willard Monroy M.D.

## 2018-04-05 ENCOUNTER — DOCUMENTATION (OUTPATIENT)
Dept: BEHAVIORAL HEALTH | Facility: PHYSICIAN GROUP | Age: 58
End: 2018-04-05

## 2018-06-20 ENCOUNTER — OFFICE VISIT (OUTPATIENT)
Dept: BEHAVIORAL HEALTH | Facility: PHYSICIAN GROUP | Age: 58
End: 2018-06-20
Payer: COMMERCIAL

## 2018-06-20 VITALS
SYSTOLIC BLOOD PRESSURE: 128 MMHG | DIASTOLIC BLOOD PRESSURE: 80 MMHG | TEMPERATURE: 98.1 F | HEART RATE: 80 BPM | BODY MASS INDEX: 49.69 KG/M2 | RESPIRATION RATE: 16 BRPM | HEIGHT: 62 IN | WEIGHT: 270 LBS

## 2018-06-20 DIAGNOSIS — F33.0 MILD EPISODE OF RECURRENT MAJOR DEPRESSIVE DISORDER (HCC): ICD-10-CM

## 2018-06-20 PROCEDURE — 90833 PSYTX W PT W E/M 30 MIN: CPT | Performed by: PSYCHIATRY & NEUROLOGY

## 2018-06-20 PROCEDURE — 99213 OFFICE O/P EST LOW 20 MIN: CPT | Performed by: PSYCHIATRY & NEUROLOGY

## 2018-06-20 ASSESSMENT — PATIENT HEALTH QUESTIONNAIRE - PHQ9
5. POOR APPETITE OR OVEREATING: 1
SUM OF ALL RESPONSES TO PHQ9 QUESTIONS 1 AND 2: 2
6. FEELING BAD ABOUT YOURSELF - OR THAT YOU ARE A FAILURE OR HAVE LET YOURSELF OR YOUR FAMILY DOWN: 1
8. MOVING OR SPEAKING SO SLOWLY THAT OTHER PEOPLE COULD HAVE NOTICED. OR THE OPPOSITE, BEING SO FIGETY OR RESTLESS THAT YOU HAVE BEEN MOVING AROUND A LOT MORE THAN USUAL: 0
1. LITTLE INTEREST OR PLEASURE IN DOING THINGS: 1
SUM OF ALL RESPONSES TO PHQ QUESTIONS 1-9: 6
7. TROUBLE CONCENTRATING ON THINGS, SUCH AS READING THE NEWSPAPER OR WATCHING TELEVISION: 0
3. TROUBLE FALLING OR STAYING ASLEEP OR SLEEPING TOO MUCH: 1
9. THOUGHTS THAT YOU WOULD BE BETTER OFF DEAD, OR OF HURTING YOURSELF: 0
2. FEELING DOWN, DEPRESSED, IRRITABLE, OR HOPELESS: 1
4. FEELING TIRED OR HAVING LITTLE ENERGY: 1

## 2018-08-28 ENCOUNTER — OFFICE VISIT (OUTPATIENT)
Dept: BEHAVIORAL HEALTH | Facility: PHYSICIAN GROUP | Age: 58
End: 2018-08-28
Payer: COMMERCIAL

## 2018-08-28 VITALS
WEIGHT: 270 LBS | DIASTOLIC BLOOD PRESSURE: 80 MMHG | BODY MASS INDEX: 49.69 KG/M2 | SYSTOLIC BLOOD PRESSURE: 126 MMHG | TEMPERATURE: 98.1 F | HEIGHT: 62 IN | RESPIRATION RATE: 16 BRPM | HEART RATE: 84 BPM

## 2018-08-28 DIAGNOSIS — F33.0 MILD EPISODE OF RECURRENT MAJOR DEPRESSIVE DISORDER (HCC): ICD-10-CM

## 2018-08-28 PROCEDURE — 99213 OFFICE O/P EST LOW 20 MIN: CPT | Performed by: PSYCHIATRY & NEUROLOGY

## 2018-08-28 PROCEDURE — 90833 PSYTX W PT W E/M 30 MIN: CPT | Performed by: PSYCHIATRY & NEUROLOGY

## 2018-08-28 ASSESSMENT — PATIENT HEALTH QUESTIONNAIRE - PHQ9
3. TROUBLE FALLING OR STAYING ASLEEP OR SLEEPING TOO MUCH: 1
1. LITTLE INTEREST OR PLEASURE IN DOING THINGS: 1
4. FEELING TIRED OR HAVING LITTLE ENERGY: 1
6. FEELING BAD ABOUT YOURSELF - OR THAT YOU ARE A FAILURE OR HAVE LET YOURSELF OR YOUR FAMILY DOWN: 1
7. TROUBLE CONCENTRATING ON THINGS, SUCH AS READING THE NEWSPAPER OR WATCHING TELEVISION: 0
8. MOVING OR SPEAKING SO SLOWLY THAT OTHER PEOPLE COULD HAVE NOTICED. OR THE OPPOSITE, BEING SO FIGETY OR RESTLESS THAT YOU HAVE BEEN MOVING AROUND A LOT MORE THAN USUAL: 0
SUM OF ALL RESPONSES TO PHQ9 QUESTIONS 1 AND 2: 2
5. POOR APPETITE OR OVEREATING: 1
SUM OF ALL RESPONSES TO PHQ QUESTIONS 1-9: 6
9. THOUGHTS THAT YOU WOULD BE BETTER OFF DEAD, OR OF HURTING YOURSELF: 0
2. FEELING DOWN, DEPRESSED, IRRITABLE, OR HOPELESS: 1

## 2018-08-29 NOTE — PROGRESS NOTES
"[facility]  PSYCHIATRIC FOLLOW-UP NOTE    Name: Rita ALCALA [place]  MRN: [id number]  : 1960  Age: 57 y.o.  Date of assessment: 2018  PCP: DANNIELLE Ricardo  Persons in attendance: Patient  REASON FOR VISIT/CHIEF COMPLAINT (as stated by Patient):  Rita ALCALA [place] is a 57 y.o., White female, attending follow-up appointment for   Chief Complaint   Patient presents with   • Medication Management     I am doing well and I feel rested at night.   .      HISTORY OF PRESENT ILLNESS:    This is 56 yo female, employed, seen today for follow up after three months. The patients [facility] has been covering her visit and she is paying only her co pay. 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 The patient has been sleeping few hours at night and she is getting rested sleep. The patient is taking sertraline 50 mg one a day and it is helping her.    PSYCHOSOCIAL CHANGES SINCE PREVIOUS CONTACT:  The patient denied depression and her anxiety improved.    RESPONSE TO TREATMENT:  She is taking sertraline 50 one a day.    MEDICATION SIDE EFFECTS:  Denied.    REVIEW OF SYSTEMS:        Constitutional positive - obesity   Eyes positive - cataract   Ears/Nose/Mouth/Throat negative   Cardiovascular negative   Respiratory positive - sleep apnea.   Gastrointestinal positive - gastroesophageal reflex disease.   Genitourinary negative   Muscular negative   Integumentary negative   Neurological negative   Endocrine negative   Hematologic/Lymphatic negative       PSYCHIATRIC EXAMINATION/MENTAL STATUS  /80   Pulse 84   Temp 36.7 °C (98.1 °F)   Resp 16   Ht 1.575 m (5' 2.01\")   Wt 122.5 kg (270 lb)   BMI 49.37 kg/m²   Participation: Active verbal participation, Attentive and Engaged  Grooming:Neat  Orientation: Alert and Fully Oriented  Eye contact: Good  Behavior:Calm   Mood: Anxious  Affect: Flexible and Full range  Thought process: Logical and Goal-directed  Thought content:  Within normal " limits  Speech: Rate within normal limits and Volume within normal limits  Perception:  Within normal limits  Memory:  No gross evidence of memory deficits  Insight: Good  Judgment: Good  Family/couple interaction observations:   Other:    Current risk:    Suicide: Low   Homicide: Low   Self-harm: Low  Relapse: Low  Other:   Crisis Safety Plan reviewed?Yes  If evidence of imminent risk is present, intervention/plan:    Medical Records/Labs/Diagnostic Tests Reviewed: yes.    Medical Records/Labs/Diagnostic Tests Ordered: none.    DIAGNOSTIC IMPRESSION(S):  1. Mild episode of recurrent major depressive disorder (HCC)           ASSESSMENT AND PLAN:    1. Major depression.  Sertraline 50 mg one a day    2. Follow up in three months.    16 minutes were spent in psychotherapy.  (If greater than 16 minutes, add 14744 code)   Topics addressed in psychotherapy include:  Psycho education and cognitive clarifications.  Agreed to keep a daily routine and a good sleep cycle.  We discussed her negative automatic thoughts and helped her to improve it.  Willard Monroy M.D.

## 2018-11-27 ENCOUNTER — HOSPITAL ENCOUNTER (OUTPATIENT)
Dept: RADIOLOGY | Facility: MEDICAL CENTER | Age: 58
End: 2018-11-27
Attending: NURSE PRACTITIONER
Payer: COMMERCIAL

## 2018-11-27 DIAGNOSIS — N95.0 POSTMENOPAUSAL BLEEDING: ICD-10-CM

## 2018-11-27 PROCEDURE — 76830 TRANSVAGINAL US NON-OB: CPT

## 2018-11-29 ENCOUNTER — HOSPITAL ENCOUNTER (OUTPATIENT)
Dept: RADIOLOGY | Facility: MEDICAL CENTER | Age: 58
End: 2018-11-29
Attending: NURSE PRACTITIONER
Payer: COMMERCIAL

## 2018-11-29 DIAGNOSIS — Z12.39 SCREENING BREAST EXAMINATION: ICD-10-CM

## 2018-11-29 PROCEDURE — 77067 SCR MAMMO BI INCL CAD: CPT

## 2018-12-11 ENCOUNTER — OFFICE VISIT (OUTPATIENT)
Dept: BEHAVIORAL HEALTH | Facility: CLINIC | Age: 58
End: 2018-12-11
Payer: COMMERCIAL

## 2018-12-11 VITALS
SYSTOLIC BLOOD PRESSURE: 124 MMHG | DIASTOLIC BLOOD PRESSURE: 78 MMHG | BODY MASS INDEX: 49.32 KG/M2 | HEART RATE: 88 BPM | WEIGHT: 268 LBS | RESPIRATION RATE: 16 BRPM | HEIGHT: 62 IN

## 2018-12-11 DIAGNOSIS — F33.0 MILD EPISODE OF RECURRENT MAJOR DEPRESSIVE DISORDER (HCC): ICD-10-CM

## 2018-12-11 DIAGNOSIS — F41.1 GAD (GENERALIZED ANXIETY DISORDER): ICD-10-CM

## 2018-12-11 PROCEDURE — 99213 OFFICE O/P EST LOW 20 MIN: CPT | Performed by: PSYCHIATRY & NEUROLOGY

## 2018-12-11 PROCEDURE — 90833 PSYTX W PT W E/M 30 MIN: CPT | Performed by: PSYCHIATRY & NEUROLOGY

## 2018-12-11 ASSESSMENT — PATIENT HEALTH QUESTIONNAIRE - PHQ9
SUM OF ALL RESPONSES TO PHQ QUESTIONS 1-9: 7
5. POOR APPETITE OR OVEREATING: 1 - SEVERAL DAYS
CLINICAL INTERPRETATION OF PHQ2 SCORE: 2

## 2018-12-12 NOTE — BH THERAPY
"[facility]  PSYCHIATRIC FOLLOW-UP NOTE    Name: Rita Kelly  MRN: [id number]  : 1960  Age: 58 y.o.  Date of assessment: 2018  PCP: DANNIELLE Ricardo  Persons in attendance: Patient  Total face-to-face time: 30 minutes    REASON FOR VISIT/CHIEF COMPLAINT (as stated by Patient):  Rita eKlly is a 58 y.o., White female, attending follow-up appointment for   Chief Complaint   Patient presents with   • Medication Management     The patient is seen today for follow up on her depression and anxiety.    .      HISTORY OF PRESENT ILLNESS:    This is a 59 yo female, employed, seen today for follow up after three months.. The patient had an Ultrasound and mammogram last month. The patient is waiting for the follow up. The patient bought a RV and moved in to it. The patient is visiting her family and had a nice thanksgiving day with them. The patient has been sleeping well and her depression and anxiety improved. The patient is going to stay home and spend time with her friends for Xmas.    PSYCHOSOCIAL CHANGES SINCE PREVIOUS CONTACT:  The patient denied depression and her anxiety improved.    RESPONSE TO TREATMENT:  She is taking sertraline 50 mg one day.    MEDICATION SIDE EFFECTS:  Denied.    REVIEW OF SYSTEMS:        Constitutional positive - obesity   Eyes positive - cataract   Ears/Nose/Mouth/Throat negative   Cardiovascular negative   Respiratory positive - obstructive sleep apnea.   Gastrointestinal positive - gastroesophageal reflex disease.   Genitourinary negative   Muscular negative   Integumentary negative   Neurological negative   Endocrine negative   Hematologic/Lymphatic negative       PSYCHIATRIC EXAMINATION/MENTAL STATUS  /78   Pulse 88   Resp 16   Ht 1.575 m (5' 2.01\")   Wt 121.6 kg (268 lb)   BMI 49.01 kg/m²   Participation: Active verbal participation, Attentive and Engaged  Grooming:Neat  Orientation: Alert and Fully Oriented  Eye contact: " Good  Behavior:Calm   Mood: Anxious  Affect: Flexible and Full range  Thought process: Logical and Goal-directed  Thought content:  Within normal limits  Speech: Rate within normal limits and Volume within normal limits  Perception:  Within normal limits  Memory:  No gross evidence of memory deficits  Insight: Good  Judgment: Good  Family/couple interaction observations:   Other:    Current risk:    Suicide: Low   Homicide: Low   Self-harm: Low  Relapse: Low  Other:   Crisis Safety Plan reviewed?Yes  If evidence of imminent risk is present, intervention/plan:    Medical Records/Labs/Diagnostic Tests Reviewed: yes.    Medical Records/Labs/Diagnostic Tests Ordered: none.    DIAGNOSTIC IMPRESSION(S):  1. Mild episode of recurrent major depressive disorder (HCC)    2. SHAI (generalized anxiety disorder)           ASSESSMENT AND PLAN:  1. Major depression, mild.  2. SHAI.  Sertraline 50 mg  One a day.    3. Follow up in three months.    17. minutes were spent in psychotherapy.  (If greater than 16 minutes, add 59689 code)   Topics addressed in psychotherapy include:  We discussed her unresolved emotional issues and helped her to process it.  The patient agreed to spend time for meditation and relaxation method.  Cognitive restructuring and behavioral changes.  Willard Monroy M.D.

## 2019-02-20 ENCOUNTER — APPOINTMENT (RX ONLY)
Dept: URBAN - METROPOLITAN AREA CLINIC 22 | Facility: CLINIC | Age: 59
Setting detail: DERMATOLOGY
End: 2019-02-20

## 2019-02-20 DIAGNOSIS — B86 SCABIES: ICD-10-CM

## 2019-02-20 DIAGNOSIS — D22 MELANOCYTIC NEVI: ICD-10-CM

## 2019-02-20 DIAGNOSIS — L81.4 OTHER MELANIN HYPERPIGMENTATION: ICD-10-CM

## 2019-02-20 DIAGNOSIS — Z71.89 OTHER SPECIFIED COUNSELING: ICD-10-CM

## 2019-02-20 DIAGNOSIS — L30.8 OTHER SPECIFIED DERMATITIS: ICD-10-CM

## 2019-02-20 DIAGNOSIS — D18.0 HEMANGIOMA: ICD-10-CM

## 2019-02-20 PROBLEM — J45.909 UNSPECIFIED ASTHMA, UNCOMPLICATED: Status: ACTIVE | Noted: 2019-02-20

## 2019-02-20 PROBLEM — J44.9 CHRONIC OBSTRUCTIVE PULMONARY DISEASE, UNSPECIFIED: Status: ACTIVE | Noted: 2019-02-20

## 2019-02-20 PROBLEM — L20.84 INTRINSIC (ALLERGIC) ECZEMA: Status: ACTIVE | Noted: 2019-02-20

## 2019-02-20 PROBLEM — K21.9 GASTRO-ESOPHAGEAL REFLUX DISEASE WITHOUT ESOPHAGITIS: Status: ACTIVE | Noted: 2019-02-20

## 2019-02-20 PROBLEM — D18.01 HEMANGIOMA OF SKIN AND SUBCUTANEOUS TISSUE: Status: ACTIVE | Noted: 2019-02-20

## 2019-02-20 PROBLEM — D22.62 MELANOCYTIC NEVI OF LEFT UPPER LIMB, INCLUDING SHOULDER: Status: ACTIVE | Noted: 2019-02-20

## 2019-02-20 PROBLEM — D22.5 MELANOCYTIC NEVI OF TRUNK: Status: ACTIVE | Noted: 2019-02-20

## 2019-02-20 PROBLEM — F32.9 MAJOR DEPRESSIVE DISORDER, SINGLE EPISODE, UNSPECIFIED: Status: ACTIVE | Noted: 2019-02-20

## 2019-02-20 PROBLEM — D22.61 MELANOCYTIC NEVI OF RIGHT UPPER LIMB, INCLUDING SHOULDER: Status: ACTIVE | Noted: 2019-02-20

## 2019-02-20 PROCEDURE — ? PRESCRIPTION

## 2019-02-20 PROCEDURE — ? COUNSELING

## 2019-02-20 PROCEDURE — ? SCABIES PREP

## 2019-02-20 PROCEDURE — 99203 OFFICE O/P NEW LOW 30 MIN: CPT

## 2019-02-20 PROCEDURE — ? ADDITIONAL NOTES

## 2019-02-20 PROCEDURE — 87220 TISSUE EXAM FOR FUNGI: CPT

## 2019-02-20 PROCEDURE — ? REFERRAL CORRESPONDENCE

## 2019-02-20 RX ORDER — PERMETHRIN 50 MG/G
CREAM TOPICAL QD
Qty: 1 | Refills: 1 | Status: ERX | COMMUNITY
Start: 2019-02-20

## 2019-02-20 RX ADMIN — PERMETHRIN: 50 CREAM TOPICAL at 19:39

## 2019-02-20 ASSESSMENT — LOCATION DETAILED DESCRIPTION DERM
LOCATION DETAILED: LEFT PROXIMAL DORSAL FOREARM
LOCATION DETAILED: RIGHT SUPERIOR UPPER BACK
LOCATION DETAILED: RIGHT INFERIOR MEDIAL UPPER BACK
LOCATION DETAILED: RIGHT ELBOW
LOCATION DETAILED: RIGHT RIB CAGE
LOCATION DETAILED: LEFT DISTAL POSTERIOR UPPER ARM
LOCATION DETAILED: EPIGASTRIC SKIN
LOCATION DETAILED: RIGHT MEDIAL UPPER BACK
LOCATION DETAILED: RIGHT PROXIMAL DORSAL FOREARM
LOCATION DETAILED: LEFT DISTAL DORSAL FOREARM
LOCATION DETAILED: RIGHT SUPERIOR MEDIAL MIDBACK
LOCATION DETAILED: RIGHT DISTAL DORSAL FOREARM

## 2019-02-20 ASSESSMENT — LOCATION SIMPLE DESCRIPTION DERM
LOCATION SIMPLE: RIGHT UPPER BACK
LOCATION SIMPLE: RIGHT LOWER BACK
LOCATION SIMPLE: LEFT POSTERIOR UPPER ARM
LOCATION SIMPLE: RIGHT ELBOW
LOCATION SIMPLE: RIGHT FOREARM
LOCATION SIMPLE: LEFT FOREARM
LOCATION SIMPLE: ABDOMEN

## 2019-02-20 ASSESSMENT — LOCATION ZONE DERM
LOCATION ZONE: ARM
LOCATION ZONE: TRUNK

## 2019-02-20 NOTE — PROCEDURE: ADDITIONAL NOTES
Additional Notes: Discussed possible bites including scabies, bed bugs.  I recommend she have her apartment checked for bed bugs.
Detail Level: Generalized

## 2019-03-18 ENCOUNTER — OFFICE VISIT (OUTPATIENT)
Dept: BEHAVIORAL HEALTH | Facility: CLINIC | Age: 59
End: 2019-03-18
Payer: COMMERCIAL

## 2019-03-18 VITALS
RESPIRATION RATE: 16 BRPM | DIASTOLIC BLOOD PRESSURE: 76 MMHG | SYSTOLIC BLOOD PRESSURE: 126 MMHG | HEIGHT: 62 IN | HEART RATE: 86 BPM | BODY MASS INDEX: 49.5 KG/M2 | WEIGHT: 269 LBS

## 2019-03-18 DIAGNOSIS — F41.1 GAD (GENERALIZED ANXIETY DISORDER): ICD-10-CM

## 2019-03-18 DIAGNOSIS — F33.0 MILD EPISODE OF RECURRENT MAJOR DEPRESSIVE DISORDER (HCC): ICD-10-CM

## 2019-03-18 PROCEDURE — 90833 PSYTX W PT W E/M 30 MIN: CPT | Performed by: PSYCHIATRY & NEUROLOGY

## 2019-03-18 PROCEDURE — 99213 OFFICE O/P EST LOW 20 MIN: CPT | Performed by: PSYCHIATRY & NEUROLOGY

## 2019-03-18 ASSESSMENT — PATIENT HEALTH QUESTIONNAIRE - PHQ9
CLINICAL INTERPRETATION OF PHQ2 SCORE: 2
5. POOR APPETITE OR OVEREATING: 1 - SEVERAL DAYS
SUM OF ALL RESPONSES TO PHQ QUESTIONS 1-9: 7

## 2019-03-18 NOTE — BH THERAPY
[facility]  PSYCHIATRIC FOLLOW-UP NOTE    Name: Rita Kelly  MRN: [id number]  : 1960  Age: 58 y.o.  Date of assessment: 3/18/2019  PCP: DANNIELLE Ricardo  Persons in attendance: Patient  Total face-to-face time: 30 minutes    REASON FOR VISIT/CHIEF COMPLAINT (as stated by Patient):  Rita Kelly is a 58 y.o., White female, attending follow-up appointment for   Chief Complaint   Patient presents with   • Medication Management     The patient is seen today for follow up on her depression and amxiety.   .      HISTORY OF PRESENT ILLNESS:    This is a 59 yo female, employed, seen today for follow up after three months. The patient reported chronic knee pain and she is using topical CBD oil and it helping with pain. The patient had chest pain few times but did not radiate and she agreed to make a follow up with cardiologist. The patient reported that her parents  of stroke and she is starting to think about death. The patient does want to stay in machine and she wants to die a natural death. 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 The patient is staying in her RV and she is enjoying that. The patient is taking sertraline with out side effects.     PSYCHOSOCIAL CHANGES SINCE PREVIOUS CONTACT:  The patient denied depression and she is sleeping well at night.    RESPONSE TO TREATMENT:  She is taking sertraline 50 mg every day for depression and anxiety.    MEDICATION SIDE EFFECTS:  Denied.    REVIEW OF SYSTEMS:        Constitutional positive - obesity   Eyes positive - cataract.   Ears/Nose/Mouth/Throat negative   Cardiovascular negative   Respiratory positive - obstructive sleep apnea.   Gastrointestinal positive - gastroesophageal reflex disease.   Genitourinary negative   Muscular negative   Integumentary negative   Neurological negative   Endocrine negative   Hematologic/Lymphatic negative       PSYCHIATRIC EXAMINATION/MENTAL STATUS  /76   Pulse 86   Resp  "16   Ht 1.575 m (5' 2.01\")   Wt 122 kg (269 lb)   BMI 49.19 kg/m²   Participation: Active verbal participation and Attentive  Grooming:Neat  Orientation: Alert and Fully Oriented  Eye contact: Good  Behavior:Calm   Mood: Anxious  Affect: Flexible and Full range  Thought process: Logical and Goal-directed  Thought content:  Within normal limits  Speech: Rate within normal limits and Volume within normal limits  Perception:  Within normal limits  Memory:  No gross evidence of memory deficits  Insight: Good  Judgment: Good  Family/couple interaction observations:   Other:    Current risk:    Suicide: Low   Homicide: Low   Self-harm: Low  Relapse: Low  Other:   Crisis Safety Plan reviewed?Yes  If evidence of imminent risk is present, intervention/plan:    Medical Records/Labs/Diagnostic Tests Reviewed: yes.    Medical Records/Labs/Diagnostic Tests Ordered: none.    DIAGNOSTIC IMPRESSION(S):  1. Mild episode of recurrent major depressive disorder (HCC)    2. SHAI (generalized anxiety disorder)           ASSESSMENT AND PLAN:  1. Major depression, recurrent, mild.  2. SHAI.  Sertraline 50 mg one day    3. Follow up in three months.    17. minutes were spent in psychotherapy.  (If greater than 16 minutes, add [id number] code)   Topics addressed in psychotherapy include:  Psycho education and cognitive clarifications.  We discussed emotional regulation and distress tolerance.  Helped her to enhance her capabilities motivated to cope well with daily stress.  Willard Monroy M.D.                   "

## 2019-06-18 ENCOUNTER — OFFICE VISIT (OUTPATIENT)
Dept: BEHAVIORAL HEALTH | Facility: CLINIC | Age: 59
End: 2019-06-18
Payer: COMMERCIAL

## 2019-06-18 VITALS
SYSTOLIC BLOOD PRESSURE: 124 MMHG | WEIGHT: 267 LBS | HEIGHT: 62 IN | RESPIRATION RATE: 14 BRPM | DIASTOLIC BLOOD PRESSURE: 78 MMHG | BODY MASS INDEX: 49.13 KG/M2 | HEART RATE: 88 BPM

## 2019-06-18 DIAGNOSIS — F41.1 GAD (GENERALIZED ANXIETY DISORDER): ICD-10-CM

## 2019-06-18 DIAGNOSIS — F33.0 MILD EPISODE OF RECURRENT MAJOR DEPRESSIVE DISORDER (HCC): ICD-10-CM

## 2019-06-18 PROCEDURE — 90833 PSYTX W PT W E/M 30 MIN: CPT | Performed by: PSYCHIATRY & NEUROLOGY

## 2019-06-18 PROCEDURE — 99213 OFFICE O/P EST LOW 20 MIN: CPT | Performed by: PSYCHIATRY & NEUROLOGY

## 2019-06-18 ASSESSMENT — PATIENT HEALTH QUESTIONNAIRE - PHQ9
CLINICAL INTERPRETATION OF PHQ2 SCORE: 2
SUM OF ALL RESPONSES TO PHQ QUESTIONS 1-9: 7
5. POOR APPETITE OR OVEREATING: 1 - SEVERAL DAYS

## 2019-06-18 NOTE — BH THERAPY
"[facility]  PSYCHIATRIC FOLLOW-UP NOTE    Name: Rita Kelly  MRN: [id number]  : 1960  Age: 58 y.o.  Date of assessment: 2019  PCP: DANNIELLE Ricardo  Persons in attendance: Patient  Total face-to-face time: 30 minutes    REASON FOR VISIT/CHIEF COMPLAINT (as stated by Patient):  Rita Kelly is a 58 y.o., White female, attending follow-up appointment for   Chief Complaint   Patient presents with   • Medication Management     The patient is seen today for follow up on her depression and anxiety.   .      HISTORY OF PRESENT ILLNESS:    This is a 57 yo female, employed, seen today for follow up after three months. The patient is recovering from common cold. The patient went to see her family and she drove her RV one way two thousand miles. The patient did well and she came back with out any trouble. The patient had a good time. The patient is back to work. The patient has been sleeping well at night.      PSYCHOSOCIAL CHANGES SINCE PREVIOUS CONTACT:  The patient reported that her anxiety and depression are improved.    RESPONSE TO TREATMENT:  She is taking sertraline 50 mg one a day.    MEDICATION SIDE EFFECTS:  Denied.    REVIEW OF SYSTEMS:        Constitutional positive - obesity   Eyes positive - cataract   Ears/Nose/Mouth/Throat negative   Cardiovascular negative   Respiratory positive - obstructive sleep apnea.   Gastrointestinal positive - gastroesophageal reflex disease.   Genitourinary negative   Muscular negative   Integumentary negative   Neurological negative   Endocrine negative   Hematologic/Lymphatic negative       PSYCHIATRIC EXAMINATION/MENTAL STATUS  /78   Pulse 88   Resp 14   Ht 1.575 m (5' 2.01\")   Wt 121.1 kg (267 lb)   BMI 48.82 kg/m²   Participation: Active verbal participation, Attentive and Engaged  Grooming:Neat  Orientation: Alert and Fully Oriented  Eye contact: Good  Behavior:Calm   Mood: Anxious  Affect: Flexible and Full " range  Thought process: Logical and Goal-directed  Thought content:  Within normal limits  Speech: Rate within normal limits and Volume within normal limits  Perception:  Within normal limits  Memory:  No gross evidence of memory deficits  Insight: Good  Judgment: Good  Family/couple interaction observations:   Other:    Current risk:    Suicide: Low   Homicide: Low   Self-harm: Low  Relapse: Low  Other:   Crisis Safety Plan reviewed?Yes  If evidence of imminent risk is present, intervention/plan:    Medical Records/Labs/Diagnostic Tests Reviewed: yes.    Medical Records/Labs/Diagnostic Tests Ordered: none.    DIAGNOSTIC IMPRESSION(S):  1. Mild episode of recurrent major depressive disorder (HCC)    2. SHAI (generalized anxiety disorder)           ASSESSMENT AND PLAN:  1. Major depression, recurrent, mild.  2. SHAI  Continue sertraline 50 mg one a day.    3. Follow up with the new provider in three months.  We discussed termination of treatment with this provider and patient agreed.    17 minutes were spent in psychotherapy.  (If greater than 16 minutes, add [id number] code)   Topics addressed in psychotherapy include:  We discussed her negative automatic thoughts and helped her to process it.  Cognitive restructuring and behavioral changes.  Willard Monroy M.D.

## 2019-11-27 ENCOUNTER — OFFICE VISIT (OUTPATIENT)
Dept: BEHAVIORAL HEALTH | Facility: CLINIC | Age: 59
End: 2019-11-27
Payer: COMMERCIAL

## 2019-11-27 VITALS
RESPIRATION RATE: 16 BRPM | HEIGHT: 62 IN | SYSTOLIC BLOOD PRESSURE: 146 MMHG | BODY MASS INDEX: 46.93 KG/M2 | HEART RATE: 68 BPM | WEIGHT: 255 LBS | DIASTOLIC BLOOD PRESSURE: 90 MMHG

## 2019-11-27 DIAGNOSIS — F31.0 BIPOLAR AFFECTIVE DISORDER, CURRENT EPISODE HYPOMANIC (HCC): ICD-10-CM

## 2019-11-27 DIAGNOSIS — F41.1 GAD (GENERALIZED ANXIETY DISORDER): ICD-10-CM

## 2019-11-27 PROCEDURE — 99214 OFFICE O/P EST MOD 30 MIN: CPT | Performed by: NURSE PRACTITIONER

## 2019-11-27 RX ORDER — ARIPIPRAZOLE 5 MG/1
5 TABLET ORAL DAILY
Qty: 30 TAB | Refills: 1 | Status: SHIPPED | OUTPATIENT
Start: 2019-11-27 | End: 2019-11-27

## 2019-11-27 RX ORDER — ARIPIPRAZOLE 5 MG/1
5 TABLET ORAL DAILY
Qty: 30 TAB | Refills: 1 | Status: SHIPPED | OUTPATIENT
Start: 2019-11-27 | End: 2019-12-02 | Stop reason: SDUPTHER

## 2019-11-27 NOTE — PROGRESS NOTES
"PSYCHIATRY FOLLOW-UP NOTE    Chief Complaint:    \" I am hypersexual.\"    History Of Present Illness:  Rita Kelly is a 58 y.o. female with MDD/SHAI comes in today for follow up, was last seen in this office by Dr. LUZ on 6.18.19.  Patient is new to this provider and is given a 30 minute follow-up appointment today.    The patient presents as hypomanic today.  She presents with rapid, pressured speech and is hard to redirect at times.  She notes she is coming in due to an increased issue with hypersexuality.  She notes that the last week, she has been having constant fantasies of her , whom is 20 years her may.  She has been having the constant need to masturbate, often many times a day, which is far from her norm of approximately once a month prior to this.  She made an urgent appointment today because she feels her thoughts are \"overcoming common sense.\"  During this time, she has also felt in an elevated mood and has had increased goal-directed behaviors. She feels as though her mind is racing and she feels as though she has had a lot of energy, which is uncommon for her. She feels she has had similar \"manic states\" in the past, but her last one was \"several years ago.\"  She denies auditory or visual hallucinations, denies paranoia.    The patient denies feeling depressed.  She notes she has been on Zoloft for the last 2 years and is tolerating well without side effects.  On this medication, she feels \"balanced and content.\"  She has had a recent weight loss lately due to joining a weight loss program online, dieting, and exercising.  She feels since she has been doing this, she feels less fatigued.  She denies suicidal ideations, passive or active at this time.  Her anxiety has increased the last few weeks.  She attributes a lot of it due to \"nervous sexual tension.\"  She constantly feels tense and restless.  She feels worry some.  She feels irritable at times.  Often, exercising helps " "decrease her anxiety.  She denies panic attacks.    Current Psychotropic Medications:  Sertraline 50 mg p.o. daily    Past Psychotropic Medication Trials:  Paxil: akathisia   Prozac: made more depressed  Celexa: became ineffective  Buspar   Geodon: hallucinations    Past Psychiatric History:  Denies inpatient hospitalizations.  Denies suicide attempts.  Therapy in the past, none currently.    Social History (changes since last visit):   Single, never , no kids. Works full-time at a [employer], has law degree.  Lives alone with 2 dogs.     Substance Use:  Alcohol: hx of alcoholism, sober for 25 years  Nicotine: denies  Illicit drugs: denies  Caffeine: 2 cups/day    Depression Screening (PHQ-9 Score):   Depression Screen (PHQ-2/PHQ-9) 12/11/2018 3/18/2019 6/18/2019   PHQ-2 Total Score - - -   PHQ-2 Total Score 2 2 2   PHQ-9 Total Score - - -   PHQ-9 Total Score 7 7 7     Interpretation of PHQ-9 Total Score   Score Severity   1-4 No Depression   5-9 Mild Depression   10-14 Moderate Depression   15-19 Moderately Severe Depression   20-27 Severe Depression    Physical Examination:    11/27/19 11:08 AM     BP  146/90     Pulse  68     Resp  16     Weight   115.7 kg (255 lb)     Height  1.575 m (5' 2\")      Musculoskeletal: age-appropriate gait and station, good balance and no abnormal movements noted    Review of Symptoms:  Constitutional: denies recent chills, fevers.  Overweight.  Neuro: denies recent weakness, numbness, or headaches  HEENT: History of allergic rhinitis  Cardiovascular: History of aortic stenosis and tachycardia  Respiratory: History of obstructive sleep apnea   GI: History of hernia, spleenectomy and a failed gastric sleeve  : denies recent urinary urgency frequency or urgency  Skin: denies recent rash or skin lesions  Endocrine: denies recent cold and heat intolerance, denies excessive thirst  Hematologic: denies recent abnormal bleeding and bruising easily  Psychiatric: See " "HPI    Mental Status Examination:   Appearance: 58 y.o.  female, dressed in casual attire, overweight  Behavior: cooperative, good eye contact, no psychomotor agitation or retardation noted  Participation: active verbal participation, engaged  Speech: Pressured, hyperverbal  Mood: \"balanced.\"  Affect: euthymic and mood congruent  Orientation: alert and oriented to person, place, situation, and time.  Attention/concentration: Fair.  Associations/Abstract reasoning:Adequate.   Thought Process: Tangential  Thought Content: denies passive/active suicidal or homicidal ideations, intent, or plan, denies homicidal ideations  Perception: denies auditory or visual hallucinations, no delusions noted  Fund of knowledge and vocabulary: Adequate.  Memory: No gross evidence of memory deficits  Insight: Fair.  Judgment: Fair.    Medical Records/Medications/Labs/Diagnostic Tests:   records reviewed, recent relevant provider encounters reviewed, recent relevant labs in record reviewed, all medications patient is taking reviewed.     Strengths/Assets:  Patient strengths identified included intelligence, self-awareness, social support, optimism, effectively addressed past stressors/challenges, evidence of effective treatment      Impression:  Bipolar affective disorder, hypomanic  SHAI    Plan:  1. Medication options, alternatives (including no medications) and medication risks/benefits/side effects were discussed in detail.   2. Start Abilify 5 mg po q day for mood stabilization. Discussed antipsychotic medications' side effects including headache, drowsiness, dizziness, sedation, dry mouth, constipation, weight gain, orthostatic hypotension, hypertension, dyslipidemia, hyperglycemia, diabetes mellitus, akathisia/restlessness, tremors, muscle rigidity, acute dystonia, tardive dyskinesia, etc.  3. Continue sertraline 50 mg p.o. daily.  Discussed SSRIs' 4-6 week period to assess for efficacy. Discussed side effects including " nausea/vomiting, abdominal discomfort/pain, diarrhea, headaches, drowsiness, sleep disturbances, initial transient worsening of anxiety, agitation, hypertension, fatigue, sexual dysfunction, and risk for suicidal ideations.  Verbalized understanding.  4.   Will refer to therapist at this clinic.  5.   Labs including, CBC with differential, CMP, TSH w/ T4, lipid panel, hemoglobin A1c, vitamin D, were ordered, the process for getting labs drawn was explained to the patient.  6.   The patient was counseled on the benefits of engaging in 30 minutes of aerobic physical exercise 3-5 times a week to the patient's ability to help with psychiatric symptoms, verbalized understanding. and Education on eating a balanced, healthy diet based on the food pyramid was provided, verbalized understanding.  7. The patient was advised to call, message provider on [employer], or come in to the clinic if symptoms worsen or if any future questions/issues regarding their medications arise; the patient verbalized understanding and agreement.    8. The patient was educated to call 911, call the suicide hotline, or go to local ER if having thoughts of suicide or homicide; verbalized understanding.    Return to clinic in 3-4 weeks or sooner if symptoms worsen    The proposed treatment plan was discussed with the patient who was provided the opportunity to ask questions and make suggestions regarding alternative treatment. Patient verbalized understanding and expressed agreement with the plan.     17 minutes were spent in psychotherapy including negative automatic thoughts and how to process them better emotional and behavioral regulations    JOSE Harris.    This note was created using voice recognition software (Dragon). The accuracy of the dictation is limited by the abilities of the software. I have reviewed the note prior to signing, however some errors in grammar and context are still possible. If you have any questions related to  this note please do not hesitate to contact our office.

## 2019-11-30 ENCOUNTER — HOSPITAL ENCOUNTER (OUTPATIENT)
Dept: LAB | Facility: MEDICAL CENTER | Age: 59
End: 2019-11-30
Attending: NURSE PRACTITIONER
Payer: COMMERCIAL

## 2019-11-30 DIAGNOSIS — F41.1 GAD (GENERALIZED ANXIETY DISORDER): ICD-10-CM

## 2019-11-30 DIAGNOSIS — F31.0 BIPOLAR AFFECTIVE DISORDER, CURRENT EPISODE HYPOMANIC (HCC): ICD-10-CM

## 2019-11-30 LAB
25(OH)D3 SERPL-MCNC: 37 NG/ML (ref 30–100)
ALBUMIN SERPL BCP-MCNC: 3.9 G/DL (ref 3.2–4.9)
ALBUMIN/GLOB SERPL: 1.3 G/DL
ALP SERPL-CCNC: 44 U/L (ref 30–99)
ALT SERPL-CCNC: 13 U/L (ref 2–50)
ANION GAP SERPL CALC-SCNC: 8 MMOL/L (ref 0–11.9)
ANISOCYTOSIS BLD QL SMEAR: ABNORMAL
AST SERPL-CCNC: 19 U/L (ref 12–45)
BASOPHILS # BLD AUTO: 1.7 % (ref 0–1.8)
BASOPHILS # BLD: 0.26 K/UL (ref 0–0.12)
BILIRUB SERPL-MCNC: 0.8 MG/DL (ref 0.1–1.5)
BUN SERPL-MCNC: 15 MG/DL (ref 8–22)
BURR CELLS BLD QL SMEAR: NORMAL
CALCIUM SERPL-MCNC: 9.1 MG/DL (ref 8.5–10.5)
CHLORIDE SERPL-SCNC: 106 MMOL/L (ref 96–112)
CHOLEST SERPL-MCNC: 186 MG/DL (ref 100–199)
CO2 SERPL-SCNC: 28 MMOL/L (ref 20–33)
CREAT SERPL-MCNC: 0.79 MG/DL (ref 0.5–1.4)
EOSINOPHIL # BLD AUTO: 0.92 K/UL (ref 0–0.51)
EOSINOPHIL NFR BLD: 6 % (ref 0–6.9)
ERYTHROCYTE [DISTWIDTH] IN BLOOD BY AUTOMATED COUNT: 51.9 FL (ref 35.9–50)
EST. AVERAGE GLUCOSE BLD GHB EST-MCNC: 131 MG/DL
FASTING STATUS PATIENT QL REPORTED: NORMAL
GLOBULIN SER CALC-MCNC: 3.1 G/DL (ref 1.9–3.5)
GLUCOSE SERPL-MCNC: 78 MG/DL (ref 65–99)
HBA1C MFR BLD: 6.2 % (ref 0–5.6)
HCT VFR BLD AUTO: 43.6 % (ref 37–47)
HDLC SERPL-MCNC: 48 MG/DL
HGB BLD-MCNC: 13.3 G/DL (ref 12–16)
LDLC SERPL CALC-MCNC: 122 MG/DL
LYMPHOCYTES # BLD AUTO: 3.27 K/UL (ref 1–4.8)
LYMPHOCYTES NFR BLD: 21.4 % (ref 22–41)
MANUAL DIFF BLD: NORMAL
MCH RBC QN AUTO: 24.8 PG (ref 27–33)
MCHC RBC AUTO-ENTMCNC: 30.5 G/DL (ref 33.6–35)
MCV RBC AUTO: 81.2 FL (ref 81.4–97.8)
MICROCYTES BLD QL SMEAR: ABNORMAL
MONOCYTES # BLD AUTO: 0.92 K/UL (ref 0–0.85)
MONOCYTES NFR BLD AUTO: 6 % (ref 0–13.4)
MORPHOLOGY BLD-IMP: NORMAL
NEUTROPHILS # BLD AUTO: 9.93 K/UL (ref 2–7.15)
NEUTROPHILS NFR BLD: 64.9 % (ref 44–72)
NRBC # BLD AUTO: 0 K/UL
NRBC BLD-RTO: 0 /100 WBC
OVALOCYTES BLD QL SMEAR: NORMAL
PLATELET # BLD AUTO: 388 K/UL (ref 164–446)
PLATELET BLD QL SMEAR: NORMAL
PMV BLD AUTO: 11.8 FL (ref 9–12.9)
POIKILOCYTOSIS BLD QL SMEAR: NORMAL
POTASSIUM SERPL-SCNC: 4.3 MMOL/L (ref 3.6–5.5)
PROT SERPL-MCNC: 7 G/DL (ref 6–8.2)
RBC # BLD AUTO: 5.37 M/UL (ref 4.2–5.4)
RBC BLD AUTO: PRESENT
SODIUM SERPL-SCNC: 142 MMOL/L (ref 135–145)
TRIGL SERPL-MCNC: 79 MG/DL (ref 0–149)
TSH SERPL DL<=0.005 MIU/L-ACNC: 1.72 UIU/ML (ref 0.38–5.33)
WBC # BLD AUTO: 15.3 K/UL (ref 4.8–10.8)

## 2019-11-30 PROCEDURE — 82306 VITAMIN D 25 HYDROXY: CPT

## 2019-11-30 PROCEDURE — 85027 COMPLETE CBC AUTOMATED: CPT

## 2019-11-30 PROCEDURE — 80053 COMPREHEN METABOLIC PANEL: CPT

## 2019-11-30 PROCEDURE — 36415 COLL VENOUS BLD VENIPUNCTURE: CPT

## 2019-11-30 PROCEDURE — 80061 LIPID PANEL: CPT

## 2019-11-30 PROCEDURE — 84443 ASSAY THYROID STIM HORMONE: CPT

## 2019-11-30 PROCEDURE — 85007 BL SMEAR W/DIFF WBC COUNT: CPT

## 2019-11-30 PROCEDURE — 83036 HEMOGLOBIN GLYCOSYLATED A1C: CPT

## 2019-12-02 ENCOUNTER — TELEPHONE (OUTPATIENT)
Dept: BEHAVIORAL HEALTH | Facility: CLINIC | Age: 59
End: 2019-12-02

## 2019-12-02 RX ORDER — ARIPIPRAZOLE 5 MG/1
5 TABLET ORAL DAILY
Qty: 30 TAB | Refills: 1 | Status: SHIPPED | OUTPATIENT
Start: 2019-12-02 | End: 2019-12-02 | Stop reason: SDUPTHER

## 2019-12-02 RX ORDER — ARIPIPRAZOLE 5 MG/1
5 TABLET ORAL DAILY
Qty: 30 TAB | Refills: 1 | OUTPATIENT
Start: 2019-12-02 | End: 2019-12-31 | Stop reason: SDUPTHER

## 2019-12-02 RX ORDER — ARIPIPRAZOLE 5 MG/1
5 TABLET ORAL DAILY
Qty: 30 TAB | Refills: 1 | Status: SHIPPED | OUTPATIENT
Start: 2019-12-02 | End: 2019-12-02

## 2019-12-24 NOTE — PROGRESS NOTES
"PSYCHIATRY FOLLOW-UP NOTE    Chief Complaint:    \"The medication is helping\"    History Of Present Illness:  Rita Kelly is a 59 y.o. female with  bipolar disorder, SHAI comes in today for follow up.    At her last meeting with this provider, the patient started Abilify for mood stabilization.  She notes that she is tolerating this medication well without side effects and she feels as though it is helping greatly.  She has been consistent with taking the medication.  She no longer presents as hypomanic.  Her speech is a regular rate, rhythm, and volume.  She notes she no longer feels \"so buzzed up\" and feels as though she is no longer hypomanic.  Her hypersexuality behaviors have \"calm down greatly\".  She is online dating currently, but is more reasonable and her suitors and is talking to a man in her age currently.  She feels \"more balanced than I have in a while.\"  She denies any other hypomanic or depressive episodes since her last meeting with this provider.  She denies auditory visual hallucinations, she denies paranoia.  She denies homicidal ideations.     The patient denies overt symptoms of depression or anhedonia.  She recently went on a vacation to visit her family back home for 10 days in [place], which lifted her spirits.  She is eating and sleeping well.  She has been walking or doing some sort of exercise every single day.  Her anxiety has been \"manageable\" lately.  She denies irritability or panic attacks.  She notes that she still has some racing thoughts from time to time when she gets anxious, but does not feel this warrants a medication change.  She has not made a therapy appointment as recommended.    The patient did get her labs drawn before her labs drawn.  Her A1c does appear elevated at 6.2.  The patient notes that she is being followed closely by her PCP Myranda Alvarado for this issue.  She requests to stay on her same medications at their same dosing today.     Current Psychotropic " "Medications:  Sertraline 50 mg p.o. daily  Abilify 5 mg p.o. daily     Past Psychotropic Medication Trials:  Paxil: akathisia   Prozac: made more depressed  Celexa: became ineffective  Buspar   Geodon: hallucinations     Past Psychiatric History:  Denies inpatient hospitalizations.  Denies suicide attempts.  Therapy in the past, none currently.     Social History (changes since last visit):   Single, never , no kids. 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  Lives alone with 2 dogs. Online dating currently.     Substance Use:  Alcohol: hx of alcoholism, sober for 25 years  Nicotine: denies  Illicit drugs: denies  Caffeine: 2 cups/day     Depression Screening (PHQ-9 Score):   Depression Screen (PHQ-2/PHQ-9) 12/11/2018 3/18/2019 6/18/2019   PHQ-2 Total Score - - -   PHQ-2 Total Score 2 2 2   PHQ-9 Total Score - - -   PHQ-9 Total Score 7 7 7      Interpretation of PHQ-9 Total Score   Score Severity   1-4 No Depression   5-9 Mild Depression   10-14 Moderate Depression   15-19 Moderately Severe Depression   20-27 Severe Depression     Physical Examination:   12/31/19 3:04 PM      BP  146/98     Pulse  84     Resp  16     Weight   111.1 kg (245 lb)     Height  1.575 m (5' 2\")      Musculoskeletal: age-appropriate gait and station, good balance and no abnormal movements noted     Review of Symptoms:  Constitutional: denies recent chills, fevers.  Overweight.  Neuro: denies recent weakness, numbness, or headaches  HEENT: History of allergic rhinitis  Cardiovascular: History of aortic stenosis and tachycardia  Respiratory: History of obstructive sleep apnea   GI: History of hernia, spleenectomy and a failed gastric sleeve  : denies recent urinary urgency frequency or urgency  Skin: denies recent rash or skin lesions  Endocrine: denies recent cold and heat intolerance, denies excessive thirst  Hematologic: denies recent abnormal bleeding and bruising easily  Psychiatric: See HPI     Mental Status " "Examination:   Appearance: 59 y.o.  female, dressed in casual attire, overweight  Behavior: cooperative, good eye contact, no psychomotor agitation or retardation noted  Participation: active verbal participation, engaged  Speech: regular rate, rhythm   Mood: \"more balanced.\"  Affect: euthymic and mood congruent  Orientation: alert and oriented to person, place, situation, and time.  Attention/concentration: Fair.  Associations/Abstract reasoning:Adequate.   Thought Process: linear, goal-directed  Thought Content: denies passive/active suicidal or homicidal ideations, intent, or plan, denies homicidal ideations  Perception: denies auditory or visual hallucinations, no delusions noted  Fund of knowledge and vocabulary: Adequate.  Memory: No gross evidence of memory deficits  Insight: Fair.  Judgment: Fair.     Medical Records/Medications/Labs/Diagnostic Tests:   records reviewed, recent relevant provider encounters reviewed, recent relevant labs in record reviewed, all medications patient is taking reviewed.      Results for JACKI TORRES (MRN [id number]) as of 12/31/2019 15:02   Ref. Range 11/30/2019 08:25   Sodium Latest Ref Range: 135 - 145 mmol/L 142   Potassium Latest Ref Range: 3.6 - 5.5 mmol/L 4.3   Chloride Latest Ref Range: 96 - 112 mmol/L 106   Co2 Latest Ref Range: 20 - 33 mmol/L 28   Anion Gap Latest Ref Range: 0.0 - 11.9  8.0   Glucose Latest Ref Range: 65 - 99 mg/dL 78   Bun Latest Ref Range: 8 - 22 mg/dL 15   Creatinine Latest Ref Range: 0.50 - 1.40 mg/dL 0.79   GFR If  Latest Ref Range: >60 mL/min/1.73 m 2 >60   GFR If Non  Latest Ref Range: >60 mL/min/1.73 m 2 >60   Calcium Latest Ref Range: 8.5 - 10.5 mg/dL 9.1   AST(SGOT) Latest Ref Range: 12 - 45 U/L 19   ALT(SGPT) Latest Ref Range: 2 - 50 U/L 13   Alkaline Phosphatase Latest Ref Range: 30 - 99 U/L 44   Total Bilirubin Latest Ref Range: 0.1 - 1.5 mg/dL 0.8   Albumin Latest Ref Range: 3.2 - 4.9 g/dL " 3.9   Total Protein Latest Ref Range: 6.0 - 8.2 g/dL 7.0   Globulin Latest Ref Range: 1.9 - 3.5 g/dL 3.1   A-G Ratio Latest Units: g/dL 1.3   Glycohemoglobin Latest Ref Range: 0.0 - 5.6 % 6.2 (H)   Estim. Avg Glu Latest Units: mg/dL 131   Fasting Status Unknown Fasting   Cholesterol,Tot Latest Ref Range: 100 - 199 mg/dL 186   Triglycerides Latest Ref Range: 0 - 149 mg/dL 79   HDL Latest Ref Range: >=40 mg/dL 48   LDL Latest Ref Range: <100 mg/dL 122 (H)   25-Hydroxy   Vitamin D 25 Latest Ref Range: 30 - 100 ng/mL 37   TSH Latest Ref Range: 0.380 - 5.330 uIU/mL 1.720     Strengths/Assets:  Patient strengths identified included intelligence, self-awareness, social support, optimism, effectively addressed past stressors/challenges, evidence of effective treatment                            Impression:  Bipolar affective disorder, hypomanic  SHAI     Plan:  1. Medication options, alternatives (including no medications) and medication risks/benefits/side effects were discussed in detail.   2. Continue Abilify 5 mg po q day for improved mood stabilization.   3. Continue sertraline 50 mg p.o. daily for improved anxiety.    4. Labs went over over with patient, encouraged follow up with PCP for elevated a1c.  5. The patient was advised to call, message provider on [employer], or come in to the clinic if symptoms worsen or if any future questions/issues regarding their medications arise; the patient verbalized understanding and agreement.    6. The patient was educated to call 911, call the suicide hotline, or go to local ER if having thoughts of suicide or homicide; verbalized understanding.     Return to clinic in 4 weeks or sooner if symptoms worsen    The proposed treatment plan was discussed with the patient who was provided the opportunity to ask questions and make suggestions regarding alternative treatment. Patient verbalized understanding and expressed agreement with the plan.     JOSE Harris.    This note was  created using voice recognition software (Dragon). The accuracy of the dictation is limited by the abilities of the software. I have reviewed the note prior to signing, however some errors in grammar and context are still possible. If you have any questions related to this note please do not hesitate to contact our office.

## 2019-12-31 ENCOUNTER — OFFICE VISIT (OUTPATIENT)
Dept: BEHAVIORAL HEALTH | Facility: CLINIC | Age: 59
End: 2019-12-31
Payer: COMMERCIAL

## 2019-12-31 VITALS
RESPIRATION RATE: 16 BRPM | WEIGHT: 245 LBS | HEIGHT: 62 IN | BODY MASS INDEX: 45.08 KG/M2 | HEART RATE: 84 BPM | SYSTOLIC BLOOD PRESSURE: 146 MMHG | DIASTOLIC BLOOD PRESSURE: 98 MMHG

## 2019-12-31 DIAGNOSIS — F31.0 BIPOLAR AFFECTIVE DISORDER, CURRENT EPISODE HYPOMANIC (HCC): ICD-10-CM

## 2019-12-31 DIAGNOSIS — F41.1 GAD (GENERALIZED ANXIETY DISORDER): ICD-10-CM

## 2019-12-31 PROCEDURE — 99214 OFFICE O/P EST MOD 30 MIN: CPT | Performed by: NURSE PRACTITIONER

## 2019-12-31 RX ORDER — ARIPIPRAZOLE 5 MG/1
5 TABLET ORAL DAILY
Qty: 30 TAB | Refills: 0 | Status: SHIPPED | OUTPATIENT
Start: 2019-12-31 | End: 2020-02-04 | Stop reason: SDUPTHER

## 2020-01-28 NOTE — PROGRESS NOTES
"PSYCHIATRY FOLLOW-UP NOTE    Chief Complaint:    \"Things are settling.\"     History Of Present Illness:  Rita Kelly is a 59 y.o. female with  bipolar disorder, SHAI comes in today for follow up.    2 weeks ago, after consulting this provider, the patient increased her sertraline to 100 mg p.o. daily.  She notes that she is tolerating this change without side effects at this time and feels as though this was the right thing to do.  Since that time, she no longer feels \"as deep down of a depression as I did.\"  She feels as though her depression is improving.  She has been exercising often, which helps lift her mood.  She has also been watching what she eats lately and has lost a few pounds since her last meeting with this provider because of this.  She feels as though she is getting enough sleep and feels rested the next day.  She denies suicidal ideations, passive or active at this time.    She notes she has had some situational anxiety regarding her current living situation.  She has been having remodeling done to her home, which has been stressful for her.  However, she feels as though she is coping well with these changes.  She denies panic attacks.  She notes she has not had any overt symptoms of hypomania lately.  She does not present as hypomanic as she has at previous meetings with this provider.  Her feelings of hypersexuality have lessened \" significantly.\"  She denies auditory or visual hallucinations, denies homicidal ideations.    Current Psychotropic Medications:  Sertraline 100 mg p.o. daily  Abilify 5 mg p.o. daily     Past Psychotropic Medication Trials:  Paxil: akathisia   Prozac: made more depressed  Celexa: became ineffective  Buspar   Geodon: hallucinations     Past Psychiatric History:  Denies inpatient hospitalizations.  Denies suicide attempts.  Therapy in the past, none currently.     Social History (changes since last visit):   Single, never , no kids. Works full-time at a " "[employer], has law degree.  Lives alone with 2 dogs. Online dating currently.     Substance Use:  Alcohol: hx of alcoholism, sober for 25 years  Nicotine: denies  Illicit drugs: denies  Caffeine: 2 cups/day     Depression Screening (PHQ-9 Score):   Depression Screen (PHQ-2/PHQ-9) 12/11/2018 3/18/2019 6/18/2019   PHQ-2 Total Score - - -   PHQ-2 Total Score 2 2 2   PHQ-9 Total Score - - -   PHQ-9 Total Score 7 7 7      Interpretation of PHQ-9 Total Score   Score Severity   1-4 No Depression   5-9 Mild Depression   10-14 Moderate Depression   15-19 Moderately Severe Depression   20-27 Severe Depression     Physical Examination:   2/4/20 3:14 PM      BP  140/83     Pulse  76     Resp  16     Weight   107.5 kg (237 lb)     Height  1.575 m (5' 2\")       Musculoskeletal: age-appropriate gait and station, good balance and no abnormal movements noted     Review of Symptoms:  Constitutional: denies recent chills, fevers.  Overweight.  Neuro: denies recent weakness, numbness, or headaches  HEENT: History of allergic rhinitis  Cardiovascular: History of aortic stenosis and tachycardia  Respiratory: History of obstructive sleep apnea   GI: History of hernia, spleenectomy and a failed gastric sleeve  : denies recent urinary urgency frequency or urgency  Skin: denies recent rash or skin lesions  Endocrine: denies recent cold and heat intolerance, denies excessive thirst  Hematologic: denies recent abnormal bleeding and bruising easily  Psychiatric: See HPI     Mental Status Examination:   Appearance: 59 y.o.  female, dressed in casual attire, overweight  Behavior: cooperative, good eye contact, no psychomotor agitation or retardation noted  Participation: active verbal participation, engaged  Speech: regular rate, rhythm   Mood: \"better.\"  Affect: euthymic and mood congruent  Orientation: alert and oriented to person, place, situation, and time.  Attention/concentration: Fair.  Associations/Abstract " reasoning:Adequate.   Thought Process: linear, goal-directed  Thought Content: denies passive/active suicidal or homicidal ideations, intent, or plan, denies homicidal ideations  Perception: denies auditory or visual hallucinations, no delusions noted  Fund of knowledge and vocabulary: Adequate.  Memory: No gross evidence of memory deficits  Insight: Fair.  Judgment: Fair.     Medical Records/Medications/Labs/Diagnostic Tests:   records reviewed, recent relevant provider encounters reviewed, recent relevant labs in record reviewed, all medications patient is taking reviewed.        Ref. Range 11/30/2019 08:25   Sodium Latest Ref Range: 135 - 145 mmol/L 142   Potassium Latest Ref Range: 3.6 - 5.5 mmol/L 4.3   Chloride Latest Ref Range: 96 - 112 mmol/L 106   Co2 Latest Ref Range: 20 - 33 mmol/L 28   Anion Gap Latest Ref Range: 0.0 - 11.9  8.0   Glucose Latest Ref Range: 65 - 99 mg/dL 78   Bun Latest Ref Range: 8 - 22 mg/dL 15   Creatinine Latest Ref Range: 0.50 - 1.40 mg/dL 0.79   GFR If  Latest Ref Range: >60 mL/min/1.73 m 2 >60   GFR If Non  Latest Ref Range: >60 mL/min/1.73 m 2 >60   Calcium Latest Ref Range: 8.5 - 10.5 mg/dL 9.1   AST(SGOT) Latest Ref Range: 12 - 45 U/L 19   ALT(SGPT) Latest Ref Range: 2 - 50 U/L 13   Alkaline Phosphatase Latest Ref Range: 30 - 99 U/L 44   Total Bilirubin Latest Ref Range: 0.1 - 1.5 mg/dL 0.8   Albumin Latest Ref Range: 3.2 - 4.9 g/dL 3.9   Total Protein Latest Ref Range: 6.0 - 8.2 g/dL 7.0   Globulin Latest Ref Range: 1.9 - 3.5 g/dL 3.1   A-G Ratio Latest Units: g/dL 1.3   Glycohemoglobin Latest Ref Range: 0.0 - 5.6 % 6.2 (H)   Estim. Avg Glu Latest Units: mg/dL 131   Fasting Status Unknown Fasting   Cholesterol,Tot Latest Ref Range: 100 - 199 mg/dL 186   Triglycerides Latest Ref Range: 0 - 149 mg/dL 79   HDL Latest Ref Range: >=40 mg/dL 48   LDL Latest Ref Range: <100 mg/dL 122 (H)   25-Hydroxy   Vitamin D 25 Latest Ref Range: 30 - 100 ng/mL 37    TSH Latest Ref Range: 0.380 - 5.330 uIU/mL 1.720      Strengths/Assets:  Patient strengths identified included intelligence, self-awareness, social support, optimism, effectively addressed past stressors/challenges, evidence of effective treatment     Impression:  Bipolar affective disorder, depressed  SHAI     Plan:  1. Medication options, alternatives (including no medications) and medication risks/benefits/side effects were discussed in detail.   2. Continue Abilify 5 mg po q day for improved mood stabilization.   3. Continue sertraline 100 mg p.o. daily for continued depression/anxiety.    4. Discussed termination with this provider and assisted in getting established with a new provider at this clinic.   5. The patient was advised to call, message provider on [employer], or come in to the clinic if symptoms worsen or if any future questions/issues regarding their medications arise; the patient verbalized understanding and agreement.    6. The patient was educated to call 911, call the suicide hotline, or go to local ER if having thoughts of suicide or homicide; verbalized understanding.  7. Discussed termination with this provider and assisted in getting established with a new provider at this clinic.     Return to clinic in 2 months or sooner if symptoms worsen    The proposed treatment plan was discussed with the patient who was provided the opportunity to ask questions and make suggestions regarding alternative treatment. Patient verbalized understanding and expressed agreement with the plan.     CARI [email or web address].    This note was created using voice recognition software (Dragon). The accuracy of the dictation is limited by the abilities of the software. I have reviewed the note prior to signing, however some errors in grammar and context are still possible. If you have any questions related to this note please do not hesitate to contact our office.

## 2020-02-04 ENCOUNTER — OFFICE VISIT (OUTPATIENT)
Dept: BEHAVIORAL HEALTH | Facility: CLINIC | Age: 60
End: 2020-02-04
Payer: COMMERCIAL

## 2020-02-04 VITALS
RESPIRATION RATE: 16 BRPM | BODY MASS INDEX: 43.61 KG/M2 | SYSTOLIC BLOOD PRESSURE: 140 MMHG | DIASTOLIC BLOOD PRESSURE: 83 MMHG | WEIGHT: 237 LBS | HEART RATE: 76 BPM | HEIGHT: 62 IN

## 2020-02-04 DIAGNOSIS — F31.31 BIPOLAR AFFECTIVE DISORDER, CURRENTLY DEPRESSED, MILD (HCC): ICD-10-CM

## 2020-02-04 DIAGNOSIS — F41.1 GAD (GENERALIZED ANXIETY DISORDER): ICD-10-CM

## 2020-02-04 PROBLEM — F31.30 BIPOLAR AFFECTIVE DISORDER, CURRENT EPISODE DEPRESSED (HCC): Status: ACTIVE | Noted: 2019-11-27

## 2020-02-04 PROCEDURE — 99213 OFFICE O/P EST LOW 20 MIN: CPT | Performed by: NURSE PRACTITIONER

## 2020-02-04 RX ORDER — ARIPIPRAZOLE 5 MG/1
5 TABLET ORAL DAILY
Qty: 90 TAB | Refills: 0 | Status: SHIPPED | OUTPATIENT
Start: 2020-02-04 | End: 2020-06-11 | Stop reason: SDUPTHER

## 2020-02-04 RX ORDER — SERTRALINE HYDROCHLORIDE 100 MG/1
100 TABLET, FILM COATED ORAL DAILY
Qty: 90 TAB | Refills: 0 | Status: SHIPPED | OUTPATIENT
Start: 2020-02-04 | End: 2020-05-26

## 2020-05-26 RX ORDER — SERTRALINE HYDROCHLORIDE 100 MG/1
TABLET, FILM COATED ORAL
Qty: 90 TAB | Refills: 0 | Status: SHIPPED | OUTPATIENT
Start: 2020-05-26 | End: 2020-06-11 | Stop reason: SDUPTHER

## 2020-06-10 PROBLEM — F31.81 BIPOLAR 2 DISORDER (HCC): Status: ACTIVE | Noted: 2019-11-27

## 2020-06-10 PROBLEM — F32.81 PREMENSTRUAL DYSPHORIC DISORDER: Status: ACTIVE | Noted: 2020-06-10

## 2020-06-11 ENCOUNTER — TELEMEDICINE (OUTPATIENT)
Dept: BEHAVIORAL HEALTH | Facility: CLINIC | Age: 60
End: 2020-06-11
Payer: COMMERCIAL

## 2020-06-11 VITALS — HEIGHT: 62 IN | BODY MASS INDEX: 43.61 KG/M2 | WEIGHT: 237 LBS

## 2020-06-11 DIAGNOSIS — F31.81 BIPOLAR 2 DISORDER (HCC): ICD-10-CM

## 2020-06-11 DIAGNOSIS — F41.1 GENERALIZED ANXIETY DISORDER: ICD-10-CM

## 2020-06-11 DIAGNOSIS — F32.81 PREMENSTRUAL DYSPHORIC DISORDER: ICD-10-CM

## 2020-06-11 PROCEDURE — 90792 PSYCH DIAG EVAL W/MED SRVCS: CPT | Mod: 95,CR | Performed by: PSYCHIATRY & NEUROLOGY

## 2020-06-11 RX ORDER — SERTRALINE HYDROCHLORIDE 100 MG/1
150 TABLET, FILM COATED ORAL DAILY
Qty: 135 TAB | Refills: 0 | Status: SHIPPED | OUTPATIENT
Start: 2020-06-11 | End: 2020-08-10

## 2020-06-11 RX ORDER — ARIPIPRAZOLE 5 MG/1
5 TABLET ORAL DAILY
Qty: 90 TAB | Refills: 0 | Status: SHIPPED | OUTPATIENT
Start: 2020-06-11 | End: 2020-08-11 | Stop reason: SDUPTHER

## 2020-06-11 ASSESSMENT — FIBROSIS 4 INDEX: FIB4 SCORE: 0.77

## 2020-06-11 NOTE — PROGRESS NOTES
"                                [facility] INITIAL PSYCHIATRIC EVALUATION    This provider informed the patient their medical records are totally confidential except for the use by other providers involved in their care, or if the patient signs a release, or to report instances of child or elder abuse, or if it is determined they are an immediate risk to harm themselves or others.  To avoid spread of covid-19 virus this appointment was conducted by telehealth.  The patient gave consent to have this evaluation by video telehealth.    Time at beginning of call:  10:00 AM    TOTAL FACE-TO-FACE TIME 60 minutes    CHIEF COMPLAINT       Having mood swings, generalized anxiety disorder, and PMDD.    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    HISTORY OF PRESENT ILLNESS       The patient was followed by Dr. Willard Monroy from Jul 2009 through Jun 2019 for recurrent Major Depression and Generalized Anxiety Disorder.  In Nov 2019 she saw CAMI Becker and was in a hypomanic episode and was re-diagnosed Bipolar 2 Disorder.  She had had periodic hypomanic episodes for years before.  Her hypomanic episodes last 3 days have happened since her 20's and have been characterized by intense increased energy, racing thoughts, loss of need for sleep (with 2 hours or less sleep/night X 3 nights in a row), she's euphoric, starting lots of projects, hyper-talkative, she has impulsive spending, and she has hypersexuality.  She was interested in men 20 years younger and was masturbating many times a day when her previous rate was once a month.       She became intensely sexually attracted to her  who was 20 years younger but came for a psychiatric follow up because she is a  in a guardian court and knew her judgment was impaired and her thoughts were \"overcoming common " "sense\".  [employer] is a small community and any indiscretions could cause embarasment, humiliation, or effect her job.       She also has Generalized Anxiety Disorder characterized by restlessness and being on edge, excessive worry and concern that she cannot get out of her mind, irritability, muscle tension in her neck, shoulders, and upper back, and being unable to turn her worries off to go to sleep.  When she is hypomanic, she attributes some of her increase anxiety to her having nervous sexual tension.       She also has Premenstrual Dysphoric Disorder with labile emotions, anger, depression in the week prior to her menses, breast tenderness and bloating in the week before her menses.       She lives in a motor home and feels more isolated and having more depression.    PSYCHIATRIC REVIEW OF SYSTEMS  denies psychotic symptoms including AH / VH, denies OCD symptoms, denies restrictive eating or purging, denies trauma related symptoms, see HPI for depressive symptoms, see HPI for anxeity symptoms and see HPI for manic symptoms    MEDICAL REVIEW OF SYSTEMS:   Constitutional positive - obesity   Eyes Positive - bilateral cataract surgery   Ears/Nose/Mouth/Throat positive - allergic rhinitis, uvulopalatopharyngoplasty   Cardiovascular positive - tachycardia, mild aortic stenosis   Respiratory positive - obstructive sleep apnea, COPD, pulmonary hypertension   Gastrointestinal positive - laparoscopic gastric sleeve 2010, GERD   Genitourinary negative   Muscular negative   Integumentary negative   Neurological negative   Endocrine negative   Hematologic/Lymphatic negative       PAST PSYCHIATRIC HISTORY       Bipolar 2 Disorder and Generalized Anxiety Disorder.  She denies ever having OCD, panic attacks, or PTSD.  PAST PSYCHIATRIC MEDICATIONS       Paroxetine, fluoxetine, citalopram, buspirone, ziprasidone, aripiprazole, sertraline.  SOCIAL HISTORY       Born and raised in [place].  Never  or had any children.  " Trained as a  and works in a guardianship center full time.  DRUGS none  ALCOHOL none  TOBACCO nonsmoker    MEDICAL HISTORY       Obesity, bilateral cataract surgery, allergic rhinitis, COPD, asthma, pulmonary hypertension, obstructive sleep apnea, GERD, laparoscopic gastric sleeve 2010,  uvulopalatopharyngoplasty.    CURRENT MEDICATIONS       Sertraline 100mg po daily.       Aripiprazole 5mg po daily.  ALLERGIES       Vancomycin, vanadium  MENTAL STATUS EXAMINATION    There were no vitals taken for this visit.  Participation: Active verbal participation  Grooming:Neat  Orientation: Fully Oriented  Eye contact: Good  Behavior:Calm   Mood: Depressed  Affect: Full range  Thought process: Logical  Thought content:  Within normal limits  Speech: Rate within normal limits and Volume within normal limits  Perception:  Within normal limits  Memory:  No gross evidence of memory deficits  Insight: Good  Judgment: Good  Family/couple interaction observations:   Other:    CURRENT RISK       Suicidal:Low       Homicidal:Not applicable       Self-Harm:Low       Relapse:Moderate       Crisis Safety Plan Reviewed Not Indicated    ASSESSMENT       The patient has Bipolar 2 Disorder with recurrent hypomanic episodes and generalized anxiety disorder.  Her sertraline 100mg po daily partially alleviates her depression and anxiety and her aripiprazole stabilizes her mood.   Her sertraline will be increased in dosage to 150mg po daily for better alleviation of depression and anxiety.  DIFFERENTIAL DIAGNOSES       (1) Bipolar 2 Disorder (F31.81)       (2) Generalized Anxiety Disorder (F41.1)       (3) Premenstrual Dysphoric Disorder (N94.3)  PLAN       Increase sertraline dosage to 150mg po daily.       Continue aripiprazole 5mg po daily.       RTC to  Clinic in 2 months for 30 min follow up with this provider.    Time at end of call:  10:00 AM    Patient was seen for 60 minutes face to face of which > 50% of appointment time was  spent on counseling and coordination of care regarding the above.

## 2020-08-10 RX ORDER — SERTRALINE HYDROCHLORIDE 100 MG/1
TABLET, FILM COATED ORAL
Qty: 90 TAB | Refills: 1 | Status: SHIPPED | OUTPATIENT
Start: 2020-08-10 | End: 2020-08-11 | Stop reason: SDUPTHER

## 2020-08-11 ENCOUNTER — TELEMEDICINE (OUTPATIENT)
Dept: BEHAVIORAL HEALTH | Facility: CLINIC | Age: 60
End: 2020-08-11
Payer: COMMERCIAL

## 2020-08-11 VITALS — BODY MASS INDEX: 43.61 KG/M2 | WEIGHT: 237 LBS | HEIGHT: 62 IN

## 2020-08-11 DIAGNOSIS — F41.1 GENERALIZED ANXIETY DISORDER: ICD-10-CM

## 2020-08-11 DIAGNOSIS — F31.81 BIPOLAR 2 DISORDER (HCC): ICD-10-CM

## 2020-08-11 DIAGNOSIS — F32.81 PREMENSTRUAL DYSPHORIC DISORDER: ICD-10-CM

## 2020-08-11 PROCEDURE — 90833 PSYTX W PT W E/M 30 MIN: CPT | Mod: 95,CR | Performed by: PSYCHIATRY & NEUROLOGY

## 2020-08-11 PROCEDURE — 99213 OFFICE O/P EST LOW 20 MIN: CPT | Mod: 95,CR | Performed by: PSYCHIATRY & NEUROLOGY

## 2020-08-11 RX ORDER — ARIPIPRAZOLE 5 MG/1
5 TABLET ORAL DAILY
Qty: 90 TAB | Refills: 1 | Status: SHIPPED | OUTPATIENT
Start: 2020-08-11 | End: 2020-10-12 | Stop reason: SDUPTHER

## 2020-08-11 RX ORDER — SERTRALINE HYDROCHLORIDE 100 MG/1
150 TABLET, FILM COATED ORAL DAILY
Qty: 135 TAB | Refills: 1 | Status: SHIPPED | OUTPATIENT
Start: 2020-08-11 | End: 2020-10-12 | Stop reason: SDUPTHER

## 2020-08-11 ASSESSMENT — FIBROSIS 4 INDEX: FIB4 SCORE: 0.77

## 2020-08-11 NOTE — PROGRESS NOTES
[facility] PSYCHIATRIC FOLLOW-UP NOTE    This provider informed the patient their medical records are totally confidential except for the use by other providers involved in their care, or if the patient signs a release, or to report instances of child or elder abuse, or if it is determined they are an immediate risk to harm themselves or others.  To avoid spread of covid-19 virus this appointment was conducted by telehelth.  The patient gave cnsent to have thid follow up session by video telehealth.    Time at beginning of call:  10:30 AM    TOTAL FACE-TO-FACE TIME  30 minutes    CHIEF COMPLAINT       Having mood swings, generalized anxiety, and PMDD.    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  She is followed by this provider for Bi[olar 2 Disorder, Generalized Anxiety Disorder, and Premenstrual Dysphoric Disorder (PMDD).  She has had 4 day hypomanic episodes  Since her 20's with intense increased energy, racing thoughts, loss of need for sleep (with 2 hours or less sleep/night X 3 nights in a row), feeling euphoric, starting lots of projects, impulsive spending, hyper-talkativeness, and hypersexuality.  She became interested in men 20 years younger.  She became intensely sexually attracted to a  but knew her judgment was impaired.       She has Generalized Anxiety Disorder with excessive worry and concern, irritability, and muscle tension in her neck, shoulders, and upper back.  When she is hypomanic, she attributes some of her increase in anxiety to her having nervous sexual tension.       She also has had PMDD with labile emotions, anger, and depression, breast tenderness, and bloating in the week prior to her menses.  She went through menopause.  PSYCHOSOCIAL CHANGES SINCE PREVIOUS CONTACT       She wears a distance from others at work and when she goes out she uses a mask.   She stays in contact with her sisters once or twice a month.  RESPONSE TO TREATMENT       No recent hypomanic episodes and she is motivated to do the things she has to do.  PAST PSYCHIATRIC MEDICATIONS       Paroxetine, fluoxetine, citalopram, buspirone, ziprasidone, aripiprazole, sertraline.  MEDICATION SIDE EFFECTS       none    MEDICAL REVIEW OF SYSTEMS:   Constitutional positive - obesity   Eyes Positive - bilateral cataract surgery   Ears/Nose/Mouth/Throat positive - allergic rhinitis, uvulopalatopharyngoplasty   Cardiovascular positive - tachycardia, mild aortic stenosis   Respiratory positive - obstructive sleep apnea, COPD, pulmonary hypertension   Gastrointestinal positive - laparoscopic gastric sleeve 2010, GERD   Genitourinary negative   Muscular negative   Integumentary negative   Neurological negative   Endocrine negative   Hematologic/Lymphatic negative        MENTAL STATUS EVALUATION  There were no vitals taken for this visit.  Participation: Active verbal participation  Grooming:Neat  Orientation: Fully Oriented  Eye contact: Good  Behavior:Calm   Mood: Euthymic  Affect: Constricted  Thought process: Logical  Thought content:  Within normal limits  Speech: Rate within normal limits and Volume within normal limits  Perception:  Within normal limits  Memory:  No gross evidence of memory deficits  Insight: Good  Judgment: Good  Family/couple interaction observations:   Other:  Depression Screen (PHQ-2/PHQ-9) 12/11/2018 3/18/2019 6/18/2019   PHQ-2 Total Score - - -   PHQ-2 Total Score 2 2 2   PHQ-9 Total Score - - -   PHQ-9 Total Score 7 7 7       Interpretation of PHQ-9 Total Score   Score Severity   1-4 No Depression   5-9 Mild Depression   10-14 Moderate Depression   15-19 Moderately Severe Depression   20-27 Severe Depression  Current risk:    Suicide: Low   Homicide: Not applicable   Self-harm: Low  Relapse: Moderate  Other:   Crisis Safety Plan reviewed?No  If evidence of imminent risk is present,  intervention/plan:    Medical Records/Labs/Diagnostic Tests Reviewed: yes    Medical Records/Labs/Diagnostic Tests Ordered: no    DIAGNOSTIC IMPRESSIONS       (1) Bipolar 2 Disorder (F31.81)       (2) Generalized Anxiety Disorder (F41.1)       (3) Premenstrual Dysphoric Disorder (N94.3)    ASSESSMENT AND PLAN       Having better alleviation of depression and anxiety on sertraline 150mg po daily.  She takes aripiprazole 5mg po daily for augmentation and mood stabilization.       Continue sertraline 150mg po daily.       Continue aripiprazole 5mg po daily.       RTC to  Clinic in 2 months for 30 min follow up with this provider.    Time at end of call:  11:00 AM    30 minutes spent in psychotherapy  Topics addressed in psychotherapy include:  Discussed the patient having a mechanism to assess judgment during hypomanic episodes.  Recommended getting 3 hours of sleep or less as a barometer for when she is getting hypomanic.  When she is hypomanic her judgement is more driven than driving.

## 2020-10-11 NOTE — PROGRESS NOTES
[facility] FOLLOW-UP NOTE    This provider informed the patient their medical records are totally confidential except for the use by other providers involved in their care, or if the patient signs a release, or to report instances of child or elder abuse, or if it is determined they are an immediate risk to harm themselves or others.  To avoid spread of covid-19 virus this appointment was conducted by telehealth.  The patient gave consent to have this follow up session by video telehealth.    Time at beginning of call:  03:00 PM    TOTAL FACE-TO-FACE TIME  30 minutes    CHIEF COMPLAINT       Having mood swings, generalized anxiety, and PMDD.    HISTORY OF PRESENT ILLNESS       The patient is a single 58yo W female  who is followed by this provider for Bipolar 2 Disorder, Generalized Anxiety Disorder, and Premenstrual Dysphoric Disorder.  She has had 4 day hypomanic episodes since her 20's with intense increased energy, racing thoughts, loss of need for sleep (with 2 hours or less of sleep/night X 3 nights in a row), euphoria, starting a lot of projects, impulsive spending, and hypersexuality.  She has impaired judgment and is suddenly attracted to men 20 years younger.  She also has depressive episodes with an atypical pattern of anhedonia, hypersomnia, and hyperphagia.        She has Generalized Anxiety Disorder with excessive worry and concern, restlessness, irritability, inability to relax, and muscle tension in her neck, shoulders, and upper back.        She also has had PMDD with labile emotions, anger, and depression, breast tenderness, and bloating in the week prior to her menses.  She went through menopause and her PMDD symptoms stopped.  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    PAST PSYCHIATRIC MEDICATIONS       Paroxetine, fluoxetine, citalopram, buspirone, ziprasidone, aripiprazole, sertraline.  MEDICATION SIDE EFFECTS       none    MEDICAL REVIEW OF SYSTEMS:   Constitutional positive - obesity   Eyes Positive - bilateral cataract surgery   Ears/Nose/Mouth/Throat positive - allergic rhinitis, uvulopalatopharyngoplasty   Cardiovascular positive - tachycardia, mild aortic stenosis   Respiratory positive - obstructive sleep apnea, COPD, pulmonary hypertension   Gastrointestinal positive - laparoscopic gastric sleeve 2010, GERD   Genitourinary negative   Muscular negative   Integumentary negative   Neurological negative   Endocrine negative   Hematologic/Lymphatic negative        MENTAL STATUS EVALUATION  There were no vitals taken for this visit.  Participation: Active verbal participation  Grooming:Neat  Orientation: Fully Oriented  Eye contact: Good  Behavior:Calm   Mood: Euthymic  Affect: Full range  Thought process: Logical  Thought content:  Within normal limits  Speech: Rate within normal limits and Volume within normal limits  Perception:  Within normal limits  Memory:  No gross evidence of memory deficits  Insight: Good  Judgment: Good  Family/couple interaction observations:   Other:  Depression Screen (PHQ-2/PHQ-9) 12/11/2018 3/18/2019 6/18/2019   PHQ-2 Total Score - - -   PHQ-2 Total Score 2 2 2   PHQ-9 Total Score - - -   PHQ-9 Total Score 7 7 7       Interpretation of PHQ-9 Total Score   Score Severity   1-4 No Depression   5-9 Mild Depression   10-14 Moderate Depression   15-19 Moderately Severe Depression   20-27 Severe Depression  Current risk:     Suicide: Low   Homicide: Not applicable   Self-harm: Low  Relapse: Moderate  Other:   Crisis Safety Plan reviewed?No  If evidence of imminent risk is present, intervention/plan:    Medical Records/Labs/Diagnostic Tests  Reviewed: yes    Medical Records/Labs/Diagnostic Tests Ordered: no    DIAGNOSTIC IMPRESSIONS       (1) Bipolar 2 Disorder (F31.81)       (2) Generalized Anxiety Disorder (F41.1)       (3) Premenstrual Dysphoric Disorder (N94.3)    ASSESSMENT AND PLAN       Mood has remained stable on sertraline 150mg po daily and aripiprazole 5mg po daily.       Continue sertraline 150mg po daily.       Continue aripiprazole 5mg po daily.       RTC to  Clinic in 2 months for 30 min follow up with this provider.    Time at end of call:  03:30 PM    30 minutes spent in psychotherapy  Topics addressed in psychotherapy include:  How bipolar disorder impairs judgment but doesn't feel that way.  How a manic episode may destroy your bank account, relationship, or social reputation in a very short amount of time.

## 2020-10-12 ENCOUNTER — TELEMEDICINE (OUTPATIENT)
Dept: BEHAVIORAL HEALTH | Facility: CLINIC | Age: 60
End: 2020-10-12
Payer: COMMERCIAL

## 2020-10-12 DIAGNOSIS — F31.81 BIPOLAR 2 DISORDER (HCC): ICD-10-CM

## 2020-10-12 DIAGNOSIS — F41.1 GENERALIZED ANXIETY DISORDER: ICD-10-CM

## 2020-10-12 DIAGNOSIS — F32.81 PREMENSTRUAL DYSPHORIC DISORDER: ICD-10-CM

## 2020-10-12 PROCEDURE — 99213 OFFICE O/P EST LOW 20 MIN: CPT | Mod: 95,CR | Performed by: PSYCHIATRY & NEUROLOGY

## 2020-10-12 PROCEDURE — 90833 PSYTX W PT W E/M 30 MIN: CPT | Mod: 95,CR | Performed by: PSYCHIATRY & NEUROLOGY

## 2020-10-12 RX ORDER — SERTRALINE HYDROCHLORIDE 100 MG/1
150 TABLET, FILM COATED ORAL DAILY
Qty: 135 TAB | Refills: 1 | Status: SHIPPED | OUTPATIENT
Start: 2020-10-12 | End: 2021-01-10

## 2020-10-12 RX ORDER — ARIPIPRAZOLE 5 MG/1
5 TABLET ORAL DAILY
Qty: 90 TAB | Refills: 1 | Status: SHIPPED | OUTPATIENT
Start: 2020-10-12 | End: 2021-06-07 | Stop reason: SDUPTHER

## 2021-03-15 DIAGNOSIS — Z23 NEED FOR VACCINATION: ICD-10-CM

## 2021-06-06 NOTE — PROGRESS NOTES
[facility] FOLLOW-UP NOTE    This provider informed the patient their medical records are totally confidential except for the use by other providers involved in their care, or if the patient signs a release, or to report instances of child or elder abuse, or if it is determined they are an immediate risk to harm themselves or others.  To avoid spread of covid-19 virus this appointment was conducted by telehealth.  The patient gave consent to have this follow up session bu video telehealth.    Time at beginning of call:  9:30 AM    TOTAL FACE-TO-FACE TIME  30 minutes    CHIEF COMPLAINT       Having mood swings, generalized anxiety, and PMDD.  HISTORY OF PRESENT ILLNESS            The patient is a single 58yo W female  who is followed by this provider for Bipolar 2 Disorder, Generalized Anxiety Disorder, and Premenstrual Dysphoric Disorder.  She returns for a follow up appointment after an 8 month absence.  She has had 4 day hypomanic episodes since her 20's with intense increased energy, racing thoughts, loss of need for sleep (with 2 hours or less of sleep/night X 3 nights in a row), euphoria, starting a lot of projects, impulsive spending, and hypersexuality.  She has impaired judgment and is suddenly attracted to men 20 years younger.  She also has depressive episodes with an atypical pattern of anhedonia, hypersomnia, and hyperphagia.        She has Generalized Anxiety Disorder with excessive worry and concern, restlessness, irritability, inability to relax, and muscle tension in her neck, shoulders, and upper back.        She also has had PMDD with labile emotions, anger, and depression, breast tenderness, and bloating in the week prior to her menses.  She went through menopause and her PMDD symptoms stopped.  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  She is looking for another job.  RESPONSE TO TREATMENT       Having a stable mood and alleviation of generalized anxiety on sertraline 150mg po daily.  PAST PSYCHIATRIC MEDICATIONS       Paroxetine, fluoxetine, citalopram, buspirone, ziprasidone, aripiprazole, sertraline.  MEDICATION SIDE EFFECTS       None    MEDICAL REVIEW OF SYSTEMS:   Constitutional positive - obesity   Eyes Positive - bilateral cataract surgery   Ears/Nose/Mouth/Throat positive - allergic rhinitis, uvulopalatopharyngoplasty   Cardiovascular positive - tachycardia, mild aortic stenosis   Respiratory positive - obstructive sleep apnea, COPD, pulmonary hypertension   Gastrointestinal positive - laparoscopic gastric sleeve 2010, GERD   Genitourinary negative   Muscular negative   Integumentary negative   Neurological negative   Endocrine negative   Hematologic/Lymphatic negative        MENTAL STATUS EVALUATION  There were no vitals taken for this visit.  Participation: Active verbal participation  Grooming:Neat  Orientation: Fully Oriented  Eye contact: Good  Behavior:Calm   Mood: Euthymic  Affect: Full range  Thought process: Logical  Thought content:  Within normal limits  Speech: Rate within normal limits and Volume within normal limits  Perception:  Within normal limits  Memory:  No gross evidence of memory deficits  Insight: Good  Judgment: Good  Family/couple interaction observations:   Other:  Depression Screen (PHQ-2/PHQ-9) 12/11/2018 3/18/2019 6/18/2019   PHQ-2 Total Score - - -   PHQ-2 Total Score 2 2 2   PHQ-9 Total Score - - -   PHQ-9 Total Score 7 7 7       Interpretation of PHQ-9 Total Score   Score Severity   1-4 No Depression   5-9 Mild Depression   10-14 Moderate Depression   15-19 Moderately Severe Depression   20-27 Severe Depression  Current risk:    Suicide: Low   Homicide: Not applicable   Self-harm: Low  Relapse: Moderate  Other:   Crisis Safety Plan reviewed?No  If  evidence of imminent risk is present, intervention/plan:    Medical Records/Labs/Diagnostic Tests Reviewed: yes    Medical Records/Labs/Diagnostic Tests Ordered: no    DIAGNOSTIC IMPRESSIONS       (1) Bipolar 2 Disorder (F31.81)       (2) Generalized Anxiety Disorder (F41.1)       (3) Premenstrual Dysphoric Disorder (N94.3)    ASSESSMENT AND PLAN       Mood has remained stable on sertraline 150mg po daily and aripiprazole 5mg po daily.       Continue sertraline 150mg po daily.       Continue aripiprazole 5mg po daily.       RTC to  Clinic in 3 months for 30 min follow up with this provider.    Time at end of call:  10:00 AM    30 minutes spent in psychotherapy  Topics addressed in psychotherapy include:  She realizes if she follows up on her impaired manic judgment she can destroy her reputation.  A  is too visible in the community and can't have indiscretions.  How higher dose SSRIs prevent anxiety.

## 2021-06-07 ENCOUNTER — TELEMEDICINE (OUTPATIENT)
Dept: BEHAVIORAL HEALTH | Facility: CLINIC | Age: 61
End: 2021-06-07
Payer: COMMERCIAL

## 2021-06-07 DIAGNOSIS — F32.81 PREMENSTRUAL DYSPHORIC DISORDER: ICD-10-CM

## 2021-06-07 DIAGNOSIS — F31.81 BIPOLAR 2 DISORDER (HCC): ICD-10-CM

## 2021-06-07 DIAGNOSIS — F41.1 GENERALIZED ANXIETY DISORDER: ICD-10-CM

## 2021-06-07 PROCEDURE — 99214 OFFICE O/P EST MOD 30 MIN: CPT | Mod: 95,CR | Performed by: PSYCHIATRY & NEUROLOGY

## 2021-06-07 RX ORDER — ARIPIPRAZOLE 5 MG/1
5 TABLET ORAL DAILY
Qty: 90 TABLET | Refills: 0 | Status: SHIPPED | OUTPATIENT
Start: 2021-06-07 | End: 2021-12-06

## 2021-06-07 RX ORDER — SERTRALINE HYDROCHLORIDE 100 MG/1
150 TABLET, FILM COATED ORAL
Qty: 135 TABLET | Refills: 0 | Status: SHIPPED | OUTPATIENT
Start: 2021-06-07 | End: 2021-11-12 | Stop reason: SDUPTHER

## 2021-08-08 NOTE — TELEPHONE ENCOUNTER
Phoned in prescription at [facility] on [address] as unable to send electronically at this time.   Labs/EKG/Imaging Studies/Medications

## 2021-11-12 DIAGNOSIS — F41.1 GENERALIZED ANXIETY DISORDER: ICD-10-CM

## 2021-11-13 RX ORDER — SERTRALINE HYDROCHLORIDE 100 MG/1
150 TABLET, FILM COATED ORAL
Qty: 135 TABLET | Refills: 0 | Status: SHIPPED | OUTPATIENT
Start: 2021-11-13 | End: 2022-01-31

## 2021-12-04 DIAGNOSIS — F31.81 BIPOLAR 2 DISORDER (HCC): ICD-10-CM

## 2021-12-04 DIAGNOSIS — F41.1 GENERALIZED ANXIETY DISORDER: ICD-10-CM

## 2021-12-06 RX ORDER — ARIPIPRAZOLE 5 MG/1
TABLET ORAL
Qty: 90 TABLET | Refills: 0 | Status: SHIPPED | OUTPATIENT
Start: 2021-12-06

## 2022-01-31 DIAGNOSIS — F41.1 GENERALIZED ANXIETY DISORDER: ICD-10-CM

## 2022-01-31 RX ORDER — SERTRALINE HYDROCHLORIDE 100 MG/1
TABLET, FILM COATED ORAL
Qty: 135 TABLET | Refills: 0 | Status: SHIPPED | OUTPATIENT
Start: 2022-01-31 | End: 2022-02-03

## 2022-02-03 DIAGNOSIS — F41.1 GENERALIZED ANXIETY DISORDER: ICD-10-CM

## 2022-02-03 RX ORDER — SERTRALINE HYDROCHLORIDE 100 MG/1
TABLET, FILM COATED ORAL
Qty: 135 TABLET | Refills: 0 | Status: SHIPPED | OUTPATIENT
Start: 2022-02-03